# Patient Record
Sex: FEMALE | Race: BLACK OR AFRICAN AMERICAN | NOT HISPANIC OR LATINO | Employment: FULL TIME | URBAN - METROPOLITAN AREA
[De-identification: names, ages, dates, MRNs, and addresses within clinical notes are randomized per-mention and may not be internally consistent; named-entity substitution may affect disease eponyms.]

---

## 2022-07-12 ENCOUNTER — HOSPITAL ENCOUNTER (EMERGENCY)
Facility: HOSPITAL | Age: 37
Discharge: HOME | End: 2022-07-12
Attending: EMERGENCY MEDICINE
Payer: MEDICAID

## 2022-07-12 VITALS
WEIGHT: 132.63 LBS | OXYGEN SATURATION: 100 % | TEMPERATURE: 99 F | RESPIRATION RATE: 18 BRPM | SYSTOLIC BLOOD PRESSURE: 128 MMHG | HEART RATE: 113 BPM | DIASTOLIC BLOOD PRESSURE: 75 MMHG

## 2022-07-12 DIAGNOSIS — R07.81 RIB PAIN: ICD-10-CM

## 2022-07-12 DIAGNOSIS — T14.8XXA MUSCLE STRAIN: Primary | ICD-10-CM

## 2022-07-12 LAB
BILIRUB UR QL STRIP: NEGATIVE
CLARITY UR: CLEAR
COLOR UR: YELLOW
GLUCOSE UR QL STRIP: NEGATIVE
HGB UR QL STRIP: NEGATIVE
KETONES UR QL STRIP: NEGATIVE
LEUKOCYTE ESTERASE UR QL STRIP: ABNORMAL
MICROSCOPIC COMMENT: NORMAL
NITRITE UR QL STRIP: NEGATIVE
PH UR STRIP: 6 [PH] (ref 5–8)
PROT UR QL STRIP: NEGATIVE
SP GR UR STRIP: 1.01 (ref 1–1.03)
URN SPEC COLLECT METH UR: ABNORMAL
UROBILINOGEN UR STRIP-ACNC: NEGATIVE EU/DL
WBC #/AREA URNS HPF: 3 /HPF (ref 0–5)

## 2022-07-12 PROCEDURE — 81000 URINALYSIS NONAUTO W/SCOPE: CPT | Performed by: NURSE PRACTITIONER

## 2022-07-12 PROCEDURE — 99284 EMERGENCY DEPT VISIT MOD MDM: CPT | Mod: 25

## 2022-07-12 RX ORDER — NAPROXEN 375 MG/1
375 TABLET ORAL 2 TIMES DAILY PRN
Qty: 20 TABLET | Refills: 0 | OUTPATIENT
Start: 2022-07-12 | End: 2022-08-01

## 2022-07-12 RX ORDER — METHOCARBAMOL 500 MG/1
1000 TABLET, FILM COATED ORAL 3 TIMES DAILY PRN
Qty: 30 TABLET | Refills: 0 | Status: SHIPPED | OUTPATIENT
Start: 2022-07-12 | End: 2022-07-17

## 2022-07-12 NOTE — Clinical Note
"Benny"Dejan Lorenzo was seen and treated in our emergency department on 7/12/2022.  She may return to work on 07/13/2022.       If you have any questions or concerns, please don't hesitate to call.      Alphonso Grider NP"
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
## 2022-08-01 ENCOUNTER — HOSPITAL ENCOUNTER (EMERGENCY)
Facility: HOSPITAL | Age: 37
Discharge: HOME | End: 2022-08-01
Attending: EMERGENCY MEDICINE
Payer: MEDICAID

## 2022-08-01 VITALS
WEIGHT: 129.63 LBS | SYSTOLIC BLOOD PRESSURE: 138 MMHG | HEART RATE: 99 BPM | TEMPERATURE: 100 F | HEIGHT: 65 IN | BODY MASS INDEX: 21.6 KG/M2 | RESPIRATION RATE: 16 BRPM | OXYGEN SATURATION: 100 % | DIASTOLIC BLOOD PRESSURE: 76 MMHG

## 2022-08-01 DIAGNOSIS — S43.109A: Primary | ICD-10-CM

## 2022-08-01 DIAGNOSIS — M25.512 LEFT SHOULDER PAIN: ICD-10-CM

## 2022-08-01 PROCEDURE — 99283 EMERGENCY DEPT VISIT LOW MDM: CPT

## 2022-08-01 PROCEDURE — 25000003 PHARM REV CODE 250: Performed by: EMERGENCY MEDICINE

## 2022-08-01 RX ORDER — HYDROCODONE BITARTRATE AND ACETAMINOPHEN 5; 325 MG/1; MG/1
1 TABLET ORAL
Status: COMPLETED | OUTPATIENT
Start: 2022-08-01 | End: 2022-08-01

## 2022-08-01 RX ORDER — ONDANSETRON 4 MG/1
4 TABLET, ORALLY DISINTEGRATING ORAL
Status: COMPLETED | OUTPATIENT
Start: 2022-08-01 | End: 2022-08-01

## 2022-08-01 RX ORDER — NAPROXEN 500 MG/1
500 TABLET ORAL 2 TIMES DAILY WITH MEALS
Qty: 60 TABLET | Refills: 0 | Status: SHIPPED | OUTPATIENT
Start: 2022-08-01 | End: 2023-08-19

## 2022-08-01 RX ORDER — ONDANSETRON 4 MG/1
4 TABLET, FILM COATED ORAL EVERY 6 HOURS
Qty: 30 TABLET | Refills: 0 | Status: SHIPPED | OUTPATIENT
Start: 2022-08-01 | End: 2023-08-19

## 2022-08-01 RX ORDER — IBUPROFEN 800 MG/1
800 TABLET ORAL
Status: COMPLETED | OUTPATIENT
Start: 2022-08-01 | End: 2022-08-01

## 2022-08-01 RX ORDER — HYDROCODONE BITARTRATE AND ACETAMINOPHEN 5; 325 MG/1; MG/1
1 TABLET ORAL EVERY 6 HOURS PRN
Qty: 20 TABLET | Refills: 0 | Status: SHIPPED | OUTPATIENT
Start: 2022-08-01 | End: 2023-08-19

## 2022-08-01 RX ADMIN — IBUPROFEN 800 MG: 800 TABLET, FILM COATED ORAL at 02:08

## 2022-08-01 RX ADMIN — ONDANSETRON 4 MG: 4 TABLET, ORALLY DISINTEGRATING ORAL at 02:08

## 2022-08-01 RX ADMIN — HYDROCODONE BITARTRATE AND ACETAMINOPHEN 1 TABLET: 5; 325 TABLET ORAL at 02:08
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
## 2022-08-01 NOTE — FIRST PROVIDER EVALUATION
Medical screening exam completed.  I have conducted a focused provider triage encounter, findings are as follows:    Brief history of present illness:  Patient presents with left shoulder pain and deformity after a fall a couple hours prior to arrival.    There were no vitals filed for this visit.    Pertinent physical exam:      Brief workup plan:      Preliminary workup initiated; this workup will be continued and followed by the physician or advanced practice provider that is assigned to the patient when roomed.

## 2022-08-01 NOTE — Clinical Note
"Benny Vernonimtiaz Lorenzo was seen and treated in our emergency department on 8/1/2022.  She may return to work on 08/02/2022.       If you have any questions or concerns, please don't hesitate to call.       RN    "
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
## 2023-02-05 ENCOUNTER — HOSPITAL ENCOUNTER (EMERGENCY)
Facility: HOSPITAL | Age: 38
Discharge: HOME | End: 2023-02-05
Attending: EMERGENCY MEDICINE
Payer: MEDICAID

## 2023-02-05 VITALS
WEIGHT: 130.81 LBS | SYSTOLIC BLOOD PRESSURE: 120 MMHG | RESPIRATION RATE: 18 BRPM | DIASTOLIC BLOOD PRESSURE: 69 MMHG | TEMPERATURE: 98 F | HEART RATE: 126 BPM | BODY MASS INDEX: 21.77 KG/M2 | OXYGEN SATURATION: 99 %

## 2023-02-05 DIAGNOSIS — M25.512 CHRONIC LEFT SHOULDER PAIN: Primary | ICD-10-CM

## 2023-02-05 DIAGNOSIS — G89.29 CHRONIC LEFT SHOULDER PAIN: Primary | ICD-10-CM

## 2023-02-05 PROCEDURE — 99283 EMERGENCY DEPT VISIT LOW MDM: CPT

## 2023-02-05 RX ORDER — TRAMADOL HYDROCHLORIDE 50 MG/1
50 TABLET ORAL EVERY 6 HOURS PRN
Qty: 15 TABLET | Refills: 0 | Status: SHIPPED | OUTPATIENT
Start: 2023-02-05 | End: 2023-08-19

## 2023-02-05 RX ORDER — TRAMADOL HYDROCHLORIDE 50 MG/1
50 TABLET ORAL EVERY 6 HOURS PRN
Qty: 15 TABLET | Refills: 0 | Status: SHIPPED | OUTPATIENT
Start: 2023-02-05 | End: 2023-02-05 | Stop reason: SDUPTHER

## 2023-02-05 NOTE — ED NOTES
Patient examined, evaluated, and educated on discharge instructions and prescriptions by CRISS Wells, without nursing assistance. Patient discharge to lobby by provider.
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
## 2023-08-19 ENCOUNTER — HOSPITAL ENCOUNTER (INPATIENT)
Facility: HOSPITAL | Age: 38
LOS: 3 days | Discharge: HOME | DRG: 872 | End: 2023-08-23
Attending: EMERGENCY MEDICINE | Admitting: FAMILY MEDICINE

## 2023-08-19 DIAGNOSIS — E87.6 HYPOKALEMIA: Primary | ICD-10-CM

## 2023-08-19 DIAGNOSIS — B96.20 E COLI BACTEREMIA: ICD-10-CM

## 2023-08-19 DIAGNOSIS — R78.81 E COLI BACTEREMIA: ICD-10-CM

## 2023-08-19 DIAGNOSIS — R06.02 SOB (SHORTNESS OF BREATH): ICD-10-CM

## 2023-08-19 DIAGNOSIS — D64.9 SYMPTOMATIC ANEMIA: ICD-10-CM

## 2023-08-19 DIAGNOSIS — N39.0 URINARY TRACT INFECTION WITHOUT HEMATURIA, SITE UNSPECIFIED: ICD-10-CM

## 2023-08-19 DIAGNOSIS — E83.42 HYPOMAGNESEMIA: ICD-10-CM

## 2023-08-19 DIAGNOSIS — J06.9 UPPER RESPIRATORY TRACT INFECTION, UNSPECIFIED TYPE: ICD-10-CM

## 2023-08-19 PROBLEM — K56.1 INTUSSUSCEPTION: Status: ACTIVE | Noted: 2023-08-19

## 2023-08-19 PROBLEM — D69.6 THROMBOCYTOPENIA: Status: ACTIVE | Noted: 2023-08-19

## 2023-08-19 PROBLEM — Z20.822 EXPOSURE TO COVID-19 VIRUS: Status: ACTIVE | Noted: 2023-08-19

## 2023-08-19 PROBLEM — R65.10 SIRS (SYSTEMIC INFLAMMATORY RESPONSE SYNDROME): Status: ACTIVE | Noted: 2023-08-19

## 2023-08-19 PROBLEM — N92.0 MENORRHAGIA WITH REGULAR CYCLE: Status: ACTIVE | Noted: 2023-08-19

## 2023-08-19 PROBLEM — E87.1 HYPONATREMIA: Status: ACTIVE | Noted: 2023-08-19

## 2023-08-19 LAB
ABO + RH BLD: NORMAL
ALBUMIN SERPL BCP-MCNC: 3.2 G/DL (ref 3.5–5.2)
ALBUMIN SERPL BCP-MCNC: 3.9 G/DL (ref 3.5–5.2)
ALP SERPL-CCNC: 73 U/L (ref 55–135)
ALP SERPL-CCNC: 88 U/L (ref 55–135)
ALT SERPL W/O P-5'-P-CCNC: 25 U/L (ref 10–44)
ALT SERPL W/O P-5'-P-CCNC: 33 U/L (ref 10–44)
ANION GAP SERPL CALC-SCNC: 11 MMOL/L (ref 8–16)
ANION GAP SERPL CALC-SCNC: 12 MMOL/L (ref 8–16)
ANISOCYTOSIS BLD QL SMEAR: ABNORMAL
ANISOCYTOSIS BLD QL SMEAR: ABNORMAL
AST SERPL-CCNC: 15 U/L (ref 10–40)
AST SERPL-CCNC: 20 U/L (ref 10–40)
B-HCG UR QL: NEGATIVE
B-HCG UR QL: NEGATIVE
BACTERIA #/AREA URNS HPF: ABNORMAL /HPF
BASOPHILS # BLD AUTO: 0.03 K/UL (ref 0–0.2)
BASOPHILS # BLD AUTO: 0.04 K/UL (ref 0–0.2)
BASOPHILS NFR BLD: 0.6 % (ref 0–1.9)
BASOPHILS NFR BLD: 0.6 % (ref 0–1.9)
BILIRUB SERPL-MCNC: 1.2 MG/DL (ref 0.1–1)
BILIRUB SERPL-MCNC: 1.3 MG/DL (ref 0.1–1)
BILIRUB UR QL STRIP: NEGATIVE
BLD GP AB SCN CELLS X3 SERPL QL: NORMAL
BLD PROD TYP BPU: NORMAL
BLOOD UNIT EXPIRATION DATE: NORMAL
BLOOD UNIT TYPE CODE: 7300
BLOOD UNIT TYPE: NORMAL
BUN SERPL-MCNC: 4 MG/DL (ref 6–20)
BUN SERPL-MCNC: 4 MG/DL (ref 6–20)
CALCIUM SERPL-MCNC: 8.4 MG/DL (ref 8.7–10.5)
CALCIUM SERPL-MCNC: 8.9 MG/DL (ref 8.7–10.5)
CHLORIDE SERPL-SCNC: 100 MMOL/L (ref 95–110)
CHLORIDE SERPL-SCNC: 102 MMOL/L (ref 95–110)
CLARITY UR: CLEAR
CO2 SERPL-SCNC: 19 MMOL/L (ref 23–29)
CO2 SERPL-SCNC: 21 MMOL/L (ref 23–29)
CODING SYSTEM: NORMAL
COLOR UR: YELLOW
CREAT SERPL-MCNC: 0.8 MG/DL (ref 0.5–1.4)
CREAT SERPL-MCNC: 0.9 MG/DL (ref 0.5–1.4)
CROSSMATCH INTERPRETATION: NORMAL
DACRYOCYTES BLD QL SMEAR: ABNORMAL
DACRYOCYTES BLD QL SMEAR: ABNORMAL
DIFFERENTIAL METHOD: ABNORMAL
DIFFERENTIAL METHOD: ABNORMAL
DISPENSE STATUS: NORMAL
EOSINOPHIL # BLD AUTO: 0 K/UL (ref 0–0.5)
EOSINOPHIL # BLD AUTO: 0.2 K/UL (ref 0–0.5)
EOSINOPHIL NFR BLD: 0.2 % (ref 0–8)
EOSINOPHIL NFR BLD: 2.4 % (ref 0–8)
ERYTHROCYTE [DISTWIDTH] IN BLOOD BY AUTOMATED COUNT: 29.1 % (ref 11.5–14.5)
ERYTHROCYTE [DISTWIDTH] IN BLOOD BY AUTOMATED COUNT: 29.7 % (ref 11.5–14.5)
EST. GFR  (NO RACE VARIABLE): >60 ML/MIN/1.73 M^2
EST. GFR  (NO RACE VARIABLE): >60 ML/MIN/1.73 M^2
FERRITIN SERPL-MCNC: 44 NG/ML (ref 20–300)
GLUCOSE SERPL-MCNC: 110 MG/DL (ref 70–110)
GLUCOSE SERPL-MCNC: 126 MG/DL (ref 70–110)
GLUCOSE UR QL STRIP: NEGATIVE
HCT VFR BLD AUTO: 20.4 % (ref 37–48.5)
HCT VFR BLD AUTO: 23.5 % (ref 37–48.5)
HGB BLD-MCNC: 5.4 G/DL (ref 12–16)
HGB BLD-MCNC: 6.2 G/DL (ref 12–16)
HGB UR QL STRIP: ABNORMAL
HYALINE CASTS #/AREA URNS LPF: 0 /LPF
HYPOCHROMIA BLD QL SMEAR: ABNORMAL
HYPOCHROMIA BLD QL SMEAR: ABNORMAL
IMM GRANULOCYTES # BLD AUTO: 0.02 K/UL (ref 0–0.04)
IMM GRANULOCYTES # BLD AUTO: 0.03 K/UL (ref 0–0.04)
IMM GRANULOCYTES NFR BLD AUTO: 0.4 % (ref 0–0.5)
IMM GRANULOCYTES NFR BLD AUTO: 0.5 % (ref 0–0.5)
INFLUENZA A, MOLECULAR: NEGATIVE
INFLUENZA B, MOLECULAR: NEGATIVE
KETONES UR QL STRIP: NEGATIVE
LACTATE SERPL-SCNC: 1.2 MMOL/L (ref 0.5–2.2)
LACTATE SERPL-SCNC: 2 MMOL/L (ref 0.5–2.2)
LEUKOCYTE ESTERASE UR QL STRIP: ABNORMAL
LYMPHOCYTES # BLD AUTO: 0.7 K/UL (ref 1–4.8)
LYMPHOCYTES # BLD AUTO: 1 K/UL (ref 1–4.8)
LYMPHOCYTES NFR BLD: 11.8 % (ref 18–48)
LYMPHOCYTES NFR BLD: 21.1 % (ref 18–48)
MAGNESIUM SERPL-MCNC: 1.4 MG/DL (ref 1.6–2.6)
MAGNESIUM SERPL-MCNC: 2.5 MG/DL (ref 1.6–2.6)
MCH RBC QN AUTO: 15.9 PG (ref 27–31)
MCH RBC QN AUTO: 16.1 PG (ref 27–31)
MCHC RBC AUTO-ENTMCNC: 26.4 G/DL (ref 32–36)
MCHC RBC AUTO-ENTMCNC: 26.5 G/DL (ref 32–36)
MCV RBC AUTO: 60 FL (ref 82–98)
MCV RBC AUTO: 61 FL (ref 82–98)
MICROSCOPIC COMMENT: ABNORMAL
MONOCYTES # BLD AUTO: 0.9 K/UL (ref 0.3–1)
MONOCYTES # BLD AUTO: 1.2 K/UL (ref 0.3–1)
MONOCYTES NFR BLD: 18.5 % (ref 4–15)
MONOCYTES NFR BLD: 19.2 % (ref 4–15)
NEUTROPHILS # BLD AUTO: 2.8 K/UL (ref 1.8–7.7)
NEUTROPHILS # BLD AUTO: 4.2 K/UL (ref 1.8–7.7)
NEUTROPHILS NFR BLD: 58.5 % (ref 38–73)
NEUTROPHILS NFR BLD: 66.2 % (ref 38–73)
NITRITE UR QL STRIP: NEGATIVE
NRBC BLD-RTO: 0 /100 WBC
NRBC BLD-RTO: 0 /100 WBC
NUM UNITS TRANS PACKED RBC: NORMAL
OVALOCYTES BLD QL SMEAR: ABNORMAL
OVALOCYTES BLD QL SMEAR: ABNORMAL
PH UR STRIP: 6 [PH] (ref 5–8)
PLATELET # BLD AUTO: 22 K/UL (ref 150–450)
PLATELET # BLD AUTO: 49 K/UL (ref 150–450)
PLATELET BLD QL SMEAR: ABNORMAL
PLATELET BLD QL SMEAR: ABNORMAL
PMV BLD AUTO: ABNORMAL FL (ref 9.2–12.9)
PMV BLD AUTO: ABNORMAL FL (ref 9.2–12.9)
POIKILOCYTOSIS BLD QL SMEAR: ABNORMAL
POIKILOCYTOSIS BLD QL SMEAR: ABNORMAL
POLYCHROMASIA BLD QL SMEAR: ABNORMAL
POLYCHROMASIA BLD QL SMEAR: ABNORMAL
POTASSIUM SERPL-SCNC: 3 MMOL/L (ref 3.5–5.1)
POTASSIUM SERPL-SCNC: 3.8 MMOL/L (ref 3.5–5.1)
PROT SERPL-MCNC: 6.9 G/DL (ref 6–8.4)
PROT SERPL-MCNC: 8.4 G/DL (ref 6–8.4)
PROT UR QL STRIP: ABNORMAL
RBC # BLD AUTO: 3.39 M/UL (ref 4–5.4)
RBC # BLD AUTO: 3.85 M/UL (ref 4–5.4)
RBC #/AREA URNS HPF: 3 /HPF (ref 0–4)
SARS-COV-2 RDRP RESP QL NAA+PROBE: NEGATIVE
SCHISTOCYTES BLD QL SMEAR: PRESENT
SCHISTOCYTES BLD QL SMEAR: PRESENT
SODIUM SERPL-SCNC: 131 MMOL/L (ref 136–145)
SODIUM SERPL-SCNC: 134 MMOL/L (ref 136–145)
SP GR UR STRIP: 1.01 (ref 1–1.03)
SPECIMEN OUTDATE: NORMAL
SPECIMEN SOURCE: NORMAL
URN SPEC COLLECT METH UR: ABNORMAL
UROBILINOGEN UR STRIP-ACNC: ABNORMAL EU/DL
WBC # BLD AUTO: 4.79 K/UL (ref 3.9–12.7)
WBC # BLD AUTO: 6.27 K/UL (ref 3.9–12.7)
WBC #/AREA URNS HPF: 10 /HPF (ref 0–5)

## 2023-08-19 PROCEDURE — 81025 URINE PREGNANCY TEST: CPT | Mod: 91 | Performed by: PHYSICIAN ASSISTANT

## 2023-08-19 PROCEDURE — 93005 ELECTROCARDIOGRAM TRACING: CPT

## 2023-08-19 PROCEDURE — 86920 COMPATIBILITY TEST SPIN: CPT | Performed by: EMERGENCY MEDICINE

## 2023-08-19 PROCEDURE — G0378 HOSPITAL OBSERVATION PER HR: HCPCS

## 2023-08-19 PROCEDURE — 99223 1ST HOSP IP/OBS HIGH 75: CPT | Mod: ,,, | Performed by: SURGERY

## 2023-08-19 PROCEDURE — 80053 COMPREHEN METABOLIC PANEL: CPT | Performed by: PHYSICIAN ASSISTANT

## 2023-08-19 PROCEDURE — 85025 COMPLETE CBC W/AUTO DIFF WBC: CPT | Performed by: PHYSICIAN ASSISTANT

## 2023-08-19 PROCEDURE — 93010 ELECTROCARDIOGRAM REPORT: CPT | Mod: 76,,, | Performed by: INTERNAL MEDICINE

## 2023-08-19 PROCEDURE — 99223 PR INITIAL HOSPITAL CARE,LEVL III: ICD-10-PCS | Mod: ,,, | Performed by: SURGERY

## 2023-08-19 PROCEDURE — 85025 COMPLETE CBC W/AUTO DIFF WBC: CPT | Mod: 91 | Performed by: FAMILY MEDICINE

## 2023-08-19 PROCEDURE — 87502 INFLUENZA DNA AMP PROBE: CPT | Performed by: PHYSICIAN ASSISTANT

## 2023-08-19 PROCEDURE — 96366 THER/PROPH/DIAG IV INF ADDON: CPT

## 2023-08-19 PROCEDURE — 87040 BLOOD CULTURE FOR BACTERIA: CPT | Mod: 59 | Performed by: PHYSICIAN ASSISTANT

## 2023-08-19 PROCEDURE — 99222 PR INITIAL HOSPITAL CARE,LEVL II: ICD-10-PCS | Mod: ,,, | Performed by: OBSTETRICS & GYNECOLOGY

## 2023-08-19 PROCEDURE — 36430 TRANSFUSION BLD/BLD COMPNT: CPT

## 2023-08-19 PROCEDURE — 25500020 PHARM REV CODE 255: Performed by: FAMILY MEDICINE

## 2023-08-19 PROCEDURE — 96367 TX/PROPH/DG ADDL SEQ IV INF: CPT

## 2023-08-19 PROCEDURE — 93010 EKG 12-LEAD: ICD-10-PCS | Mod: 76,,, | Performed by: INTERNAL MEDICINE

## 2023-08-19 PROCEDURE — 83735 ASSAY OF MAGNESIUM: CPT | Performed by: EMERGENCY MEDICINE

## 2023-08-19 PROCEDURE — 96365 THER/PROPH/DIAG IV INF INIT: CPT

## 2023-08-19 PROCEDURE — 96368 THER/DIAG CONCURRENT INF: CPT

## 2023-08-19 PROCEDURE — U0002 COVID-19 LAB TEST NON-CDC: HCPCS | Performed by: PHYSICIAN ASSISTANT

## 2023-08-19 PROCEDURE — 96361 HYDRATE IV INFUSION ADD-ON: CPT

## 2023-08-19 PROCEDURE — 84466 ASSAY OF TRANSFERRIN: CPT | Performed by: FAMILY MEDICINE

## 2023-08-19 PROCEDURE — 63600175 PHARM REV CODE 636 W HCPCS: Performed by: EMERGENCY MEDICINE

## 2023-08-19 PROCEDURE — 82728 ASSAY OF FERRITIN: CPT | Performed by: FAMILY MEDICINE

## 2023-08-19 PROCEDURE — 86900 BLOOD TYPING SEROLOGIC ABO: CPT | Performed by: EMERGENCY MEDICINE

## 2023-08-19 PROCEDURE — 87186 SC STD MICRODIL/AGAR DIL: CPT | Performed by: PHYSICIAN ASSISTANT

## 2023-08-19 PROCEDURE — 99291 CRITICAL CARE FIRST HOUR: CPT

## 2023-08-19 PROCEDURE — 63600175 PHARM REV CODE 636 W HCPCS: Performed by: PHYSICIAN ASSISTANT

## 2023-08-19 PROCEDURE — 83735 ASSAY OF MAGNESIUM: CPT | Mod: 91 | Performed by: FAMILY MEDICINE

## 2023-08-19 PROCEDURE — 81025 URINE PREGNANCY TEST: CPT | Performed by: EMERGENCY MEDICINE

## 2023-08-19 PROCEDURE — 83605 ASSAY OF LACTIC ACID: CPT | Performed by: PHYSICIAN ASSISTANT

## 2023-08-19 PROCEDURE — 87077 CULTURE AEROBIC IDENTIFY: CPT | Performed by: PHYSICIAN ASSISTANT

## 2023-08-19 PROCEDURE — P9016 RBC LEUKOCYTES REDUCED: HCPCS | Performed by: EMERGENCY MEDICINE

## 2023-08-19 PROCEDURE — 93010 ELECTROCARDIOGRAM REPORT: CPT | Mod: ,,, | Performed by: INTERNAL MEDICINE

## 2023-08-19 PROCEDURE — 83540 ASSAY OF IRON: CPT | Performed by: FAMILY MEDICINE

## 2023-08-19 PROCEDURE — 80053 COMPREHEN METABOLIC PANEL: CPT | Mod: 91 | Performed by: FAMILY MEDICINE

## 2023-08-19 PROCEDURE — 63600175 PHARM REV CODE 636 W HCPCS: Performed by: FAMILY MEDICINE

## 2023-08-19 PROCEDURE — 36415 COLL VENOUS BLD VENIPUNCTURE: CPT | Performed by: EMERGENCY MEDICINE

## 2023-08-19 PROCEDURE — 81000 URINALYSIS NONAUTO W/SCOPE: CPT | Performed by: PHYSICIAN ASSISTANT

## 2023-08-19 PROCEDURE — 87154 CUL TYP ID BLD PTHGN 6+ TRGT: CPT | Performed by: PHYSICIAN ASSISTANT

## 2023-08-19 PROCEDURE — 25000003 PHARM REV CODE 250: Performed by: EMERGENCY MEDICINE

## 2023-08-19 PROCEDURE — 25000003 PHARM REV CODE 250: Performed by: PHYSICIAN ASSISTANT

## 2023-08-19 PROCEDURE — 99222 1ST HOSP IP/OBS MODERATE 55: CPT | Mod: ,,, | Performed by: OBSTETRICS & GYNECOLOGY

## 2023-08-19 PROCEDURE — 96374 THER/PROPH/DIAG INJ IV PUSH: CPT | Mod: 59

## 2023-08-19 RX ORDER — LANOLIN ALCOHOL/MO/W.PET/CERES
1 CREAM (GRAM) TOPICAL DAILY
Status: DISCONTINUED | OUTPATIENT
Start: 2023-08-20 | End: 2023-08-20

## 2023-08-19 RX ORDER — ESCITALOPRAM OXALATE 10 MG/1
10 TABLET ORAL NIGHTLY
Status: DISCONTINUED | OUTPATIENT
Start: 2023-08-19 | End: 2023-08-20

## 2023-08-19 RX ORDER — QUETIAPINE FUMARATE 25 MG/1
50 TABLET, FILM COATED ORAL NIGHTLY
Status: DISCONTINUED | OUTPATIENT
Start: 2023-08-19 | End: 2023-08-20

## 2023-08-19 RX ORDER — ACETAMINOPHEN 500 MG
1000 TABLET ORAL
Status: COMPLETED | OUTPATIENT
Start: 2023-08-19 | End: 2023-08-19

## 2023-08-19 RX ORDER — POTASSIUM CHLORIDE 7.45 MG/ML
10 INJECTION INTRAVENOUS
Status: COMPLETED | OUTPATIENT
Start: 2023-08-19 | End: 2023-08-19

## 2023-08-19 RX ORDER — ONDANSETRON 2 MG/ML
4 INJECTION INTRAMUSCULAR; INTRAVENOUS
Status: COMPLETED | OUTPATIENT
Start: 2023-08-19 | End: 2023-08-19

## 2023-08-19 RX ORDER — FERROUS SULFATE 325(65) MG
325 TABLET ORAL
COMMUNITY

## 2023-08-19 RX ORDER — MAGNESIUM SULFATE HEPTAHYDRATE 40 MG/ML
2 INJECTION, SOLUTION INTRAVENOUS
Status: COMPLETED | OUTPATIENT
Start: 2023-08-19 | End: 2023-08-19

## 2023-08-19 RX ORDER — SODIUM CHLORIDE 0.9 % (FLUSH) 0.9 %
10 SYRINGE (ML) INJECTION
Status: DISCONTINUED | OUTPATIENT
Start: 2023-08-19 | End: 2023-08-23 | Stop reason: HOSPADM

## 2023-08-19 RX ORDER — IBUPROFEN 600 MG/1
600 TABLET ORAL
Status: COMPLETED | OUTPATIENT
Start: 2023-08-19 | End: 2023-08-19

## 2023-08-19 RX ORDER — ESCITALOPRAM OXALATE 10 MG/1
10 TABLET ORAL DAILY
COMMUNITY
Start: 2023-04-19

## 2023-08-19 RX ORDER — QUETIAPINE FUMARATE 50 MG/1
50 TABLET, FILM COATED ORAL NIGHTLY
COMMUNITY
Start: 2023-04-19

## 2023-08-19 RX ORDER — POTASSIUM CHLORIDE 7.45 MG/ML
20 INJECTION INTRAVENOUS
Status: DISCONTINUED | OUTPATIENT
Start: 2023-08-19 | End: 2023-08-19

## 2023-08-19 RX ORDER — HYDROCODONE BITARTRATE AND ACETAMINOPHEN 500; 5 MG/1; MG/1
TABLET ORAL
Status: DISCONTINUED | OUTPATIENT
Start: 2023-08-19 | End: 2023-08-23 | Stop reason: HOSPADM

## 2023-08-19 RX ADMIN — SODIUM CHLORIDE, POTASSIUM CHLORIDE, SODIUM LACTATE AND CALCIUM CHLORIDE 1680 ML: 600; 310; 30; 20 INJECTION, SOLUTION INTRAVENOUS at 12:08

## 2023-08-19 RX ADMIN — POTASSIUM CHLORIDE 10 MEQ: 7.46 INJECTION, SOLUTION INTRAVENOUS at 01:08

## 2023-08-19 RX ADMIN — POTASSIUM CHLORIDE 10 MEQ: 7.46 INJECTION, SOLUTION INTRAVENOUS at 03:08

## 2023-08-19 RX ADMIN — ONDANSETRON 4 MG: 2 INJECTION INTRAMUSCULAR; INTRAVENOUS at 06:08

## 2023-08-19 RX ADMIN — POTASSIUM BICARBONATE 40 MEQ: 391 TABLET, EFFERVESCENT ORAL at 01:08

## 2023-08-19 RX ADMIN — IBUPROFEN 600 MG: 600 TABLET, FILM COATED ORAL at 01:08

## 2023-08-19 RX ADMIN — IOHEXOL 100 ML: 350 INJECTION, SOLUTION INTRAVENOUS at 06:08

## 2023-08-19 RX ADMIN — ACETAMINOPHEN 1000 MG: 500 TABLET ORAL at 11:08

## 2023-08-19 RX ADMIN — CEFTRIAXONE 1 G: 1 INJECTION, POWDER, FOR SOLUTION INTRAMUSCULAR; INTRAVENOUS at 01:08

## 2023-08-19 RX ADMIN — MAGNESIUM SULFATE HEPTAHYDRATE 2 G: 40 INJECTION, SOLUTION INTRAVENOUS at 03:08

## 2023-08-19 NOTE — ASSESSMENT & PLAN NOTE
This can certainly contribute to heavy menstrual bleeding.  Not sure if this has been a chronic problem vs a new acute issue for the patient.    Splenomegaly noted on CT.  Recommend heme/onc consultation

## 2023-08-19 NOTE — ASSESSMENT & PLAN NOTE
Declines pelvic exam today in the ER.    Will await pelvic ultrasound findings and plan further work-up as an outpatient

## 2023-08-19 NOTE — SUBJECTIVE & OBJECTIVE
OB History    Para Term  AB Living   3 3 3 0 0 0   SAB IAB Ectopic Multiple Live Births   0 0 0 0 0      # Outcome Date GA Lbr Cheikh/2nd Weight Sex Delivery Anes PTL Lv   3 Term            2 Term            1 Term              Past Medical History:   Diagnosis Date    Anemia, unspecified      Past Surgical History:   Procedure Laterality Date     SECTION      x3    TONSILLECTOMY      TUBAL LIGATION Bilateral        (Not in a hospital admission)      Review of patient's allergies indicates:  No Known Allergies     Family History    None       Tobacco Use    Smoking status: Never    Smokeless tobacco: Not on file   Substance and Sexual Activity    Alcohol use: Never    Drug use: Not on file    Sexual activity: Yes     Partners: Male     Birth control/protection: See Surgical Hx     Review of Systems   Constitutional:  Positive for fatigue and fever. Negative for unexpected weight change.   Respiratory:  Positive for shortness of breath.    Gastrointestinal:  Negative for abdominal pain, constipation, diarrhea, nausea and vomiting.   Genitourinary:  Positive for menorrhagia and menstrual problem. Negative for dysuria, frequency, pelvic pain, urgency, vaginal bleeding, vaginal discharge, vaginal pain, postcoital bleeding and vaginal odor.      Objective:     Vital Signs (Most Recent):  Temp: 98.3 °F (36.8 °C) (23)  Pulse: 85 (23)  Resp: (!) 23 (23)  BP: 104/66 (23)  SpO2: 100 % (23) Vital Signs (24h Range):  Temp:  [98.2 °F (36.8 °C)-102.9 °F (39.4 °C)] 98.3 °F (36.8 °C)  Pulse:  [] 85  Resp:  [16-30] 23  SpO2:  [98 %-100 %] 100 %  BP: (104-128)/(66-80) 104/66     Weight: 56 kg (123 lb 7.3 oz)  Body mass index is 20.54 kg/m².    No LMP recorded.     Physical Exam:   Constitutional: She is oriented to person, place, and time. She appears well-developed and well-nourished. No distress.    HENT:   Head: Normocephalic and atraumatic.      Neck: No thyromegaly present.     Pulmonary/Chest: Effort normal.        Abdominal: Soft. She exhibits no distension and no mass. There is no abdominal tenderness. There is no rebound and no guarding.             Musculoskeletal: No edema.       Neurological: She is alert and oriented to person, place, and time.    Skin: No rash noted.   Perioral pallor noted    Psychiatric: She has a normal mood and affect. Her behavior is normal. Judgment and thought content normal.     Patient declines pelvic exam     Laboratory:  Recent Lab Results  (Last 5 results in the past 24 hours)        08/19/23  1610   08/19/23  1354   08/19/23  1318   08/19/23  1201   08/19/23  1127        Unit Blood Type Code   7300  [P]             Unit Expiration   [id number]  [P]             Unit Blood Type   B POS  [P]             Albumin 3.2       3.9         Alkaline Phosphatase 73       88         ALT 25       33         Anion Gap 11       12         Aniso Moderate       Moderate         Appearance, UA               AST 15       20         Bacteria, UA               Baso # 0.03       0.04         Basophil % 0.6       0.6         Bilirubin (UA)               BILIRUBIN TOTAL 1.2  Comment: For infants and newborns, interpretation of results should be based  on gestational age, weight and in agreement with clinical  observations.    Premature Infant recommended reference ranges:  Up to 24 hours.............<8.0 mg/dL  Up to 48 hours............<12.0 mg/dL  3-5 days..................<15.0 mg/dL  6-29 days.................<15.0 mg/dL         1.3  Comment: For infants and newborns, interpretation of results should be based  on gestational age, weight and in agreement with clinical  observations.    Premature Infant recommended reference ranges:  Up to 24 hours.............<8.0 mg/dL  Up to 48 hours............<12.0 mg/dL  3-5 days..................<15.0 mg/dL  6-29 days.................<15.0 mg/dL           BUN 4       4         Calcium 8.4        8.9         Chloride 102       100         CO2 21       19         CODING SYSTEM   [id number]  [P]             Color, UA               Creatinine 0.8       0.9         Crossmatch Interpretation   Compatible  [P]             Differential Method Automated       Automated         DISPENSE STATUS   ISSUED  [P]             eGFR >60       >60         Eos # 0.0       0.2         Eosinophil % 0.2       2.4         Ferritin 44               Glucose 110       126         Glucose, UA               Gran # (ANC) 2.8       4.2         Gran % 58.5       66.2         Group & Rh   B POS             Hematocrit 20.4       23.5         Hemoglobin 5.4  Comment: HGB AND PLT critical result(s) called and verbal readback obtained   from BERNARDA HIGHTOWER RN by GERARDO 08/19/2023 16:40         6.2         Hyaline Casts, UA               Hypo Occasional       Occasional         Immature Grans (Abs) 0.02  Comment: Mild elevation in immature granulocytes is non specific and   can be seen in a variety of conditions including stress response,   acute inflammation, trauma and pregnancy. Correlation with other   laboratory and clinical findings is essential.         0.03  Comment: Mild elevation in immature granulocytes is non specific and   can be seen in a variety of conditions including stress response,   acute inflammation, trauma and pregnancy. Correlation with other   laboratory and clinical findings is essential.           Immature Granulocytes 0.4       0.5         INDIRECT ANDRES   NEG             Ketones, UA               Lactate, Narinder   1.2  Comment: Falsely low lactic acid results can be found in samples   containing >=13.0 mg/dL total bilirubin and/or >=3.5 mg/dL   direct bilirubin.       2.0  Comment: Falsely low lactic acid results can be found in samples   containing >=13.0 mg/dL total bilirubin and/or >=3.5 mg/dL   direct bilirubin.           Leukocytes, UA               Lymph # 1.0       0.7         Lymph % 21.1       11.8         Magnesium  2.5   1.4             MCH 15.9       16.1         MCHC 26.5       26.4         MCV 60       61         Microscopic Comment               Mono # 0.9       1.2         Mono % 19.2       18.5         MPV SEE COMMENT  Comment: Result not available.       SEE COMMENT  Comment: Result not available.         NITRITE UA               nRBC 0       0         Occult Blood UA               Ovalocytes Occasional       Occasional         pH, UA               Platelet Estimate Decreased       Decreased         Platelets 22  Comment: HGB AND PLT critical result(s) called and verbal readback obtained   from BERNARDA HIGHTOWER RN by GERARDO 08/19/2023 16:40         49         Poikilocytosis Moderate       Moderate         Poly Occasional       Occasional         Potassium 3.8       3.0         Preg Test, Ur     Negative     Negative       Product Code   [id number]  [P]             PROTEIN TOTAL 6.9       8.4         Protein, UA               RBC 3.39       3.85         RBC, UA               RDW 29.1       29.7         Schistocytes Present       Present         Sodium 134       131         Specific [place], UA               Specimen Outdate   08/22/2023 23:59             Specimen UA               Teardrop Cells Occasional       Occasional         UNIT NUMBER   [id number]  [P]             UROBILINOGEN UA               WBC, UA               WBC 4.79       6.27                                 [P] - Preliminary Result               Diagnostic Results:  CT: Reviewed  Has not had ultrasound yet

## 2023-08-19 NOTE — ASSESSMENT & PLAN NOTE
Associated with abdominal pain  Surgery consulted  Repeat ct abdomen pelvis with po contrast  npo

## 2023-08-19 NOTE — HPI
38-year-old female referred for intussusception.  Patient presented with fatigue, weakness, shortness of breath and fever, recent COVID exposure with associated sore throat, abdominal.  She denies any nausea or emesis.  Having regular nonbloody bowel movements.  She underwent CT scan for evaluation noting possible jejunojejunal intussusception.

## 2023-08-19 NOTE — SUBJECTIVE & OBJECTIVE
Past Medical History:   Diagnosis Date    Anemia, unspecified        Past Surgical History:   Procedure Laterality Date    TONSILLECTOMY      TUBAL LIGATION Bilateral        Review of patient's allergies indicates:  No Known Allergies    No current facility-administered medications on file prior to encounter.     Current Outpatient Medications on File Prior to Encounter   Medication Sig    ferrous sulfate (FEOSOL) 325 mg (65 mg iron) Tab tablet Take 325 mg by mouth with breakfast.    LEXAPRO 10 mg tablet Take 10 mg by mouth.    SEROQUEL 50 mg tablet Take 50 mg by mouth every evening.    [DISCONTINUED] HYDROcodone-acetaminophen (NORCO) 5-325 mg per tablet Take 1 tablet by mouth every 6 (six) hours as needed for Pain.    [DISCONTINUED] naproxen (NAPROSYN) 500 MG tablet Take 1 tablet (500 mg total) by mouth 2 (two) times daily with meals.    [DISCONTINUED] ondansetron (ZOFRAN) 4 MG tablet Take 1 tablet (4 mg total) by mouth every 6 (six) hours.    [DISCONTINUED] traMADoL (ULTRAM) 50 mg tablet Take 1 tablet (50 mg total) by mouth every 6 (six) hours as needed for Pain.     Family History    None       Tobacco Use    Smoking status: Not on file    Smokeless tobacco: Not on file   Substance and Sexual Activity    Alcohol use: Not on file    Drug use: Not on file    Sexual activity: Not on file     Review of Systems   Constitutional:  Positive for activity change, appetite change, diaphoresis, fatigue, fever and unexpected weight change.   Respiratory:  Positive for shortness of breath.    Cardiovascular:  Negative for chest pain and leg swelling.   Gastrointestinal:  Positive for abdominal pain and nausea. Negative for vomiting.   Genitourinary:  Positive for menstrual problem.   Musculoskeletal:  Positive for back pain.   Neurological:  Positive for weakness.   Psychiatric/Behavioral:  Negative for agitation, behavioral problems, confusion, decreased concentration and dysphoric mood. The patient is not nervous/anxious.       Objective:     Vital Signs (Most Recent):  Temp: 98.7 °F (37.1 °C) (08/19/23 1449)  Pulse: 93 (08/19/23 1449)  Resp: (!) 26 (08/19/23 1449)  BP: 114/73 (08/19/23 1449)  SpO2: 99 % (08/19/23 1449) Vital Signs (24h Range):  Temp:  [98.7 °F (37.1 °C)-102.9 °F (39.4 °C)] 98.7 °F (37.1 °C)  Pulse:  [] 93  Resp:  [16-30] 26  SpO2:  [99 %-100 %] 99 %  BP: (114-128)/(73-80) 114/73     Weight: 56 kg (123 lb 7.3 oz)  Body mass index is 20.54 kg/m².     Physical Exam  Vitals and nursing note reviewed. Exam conducted with a chaperone present (nursing, visitor).   Constitutional:       General: She is not in acute distress.     Appearance: She is underweight. She is ill-appearing and diaphoretic. She is not toxic-appearing.   HENT:      Head: Normocephalic and atraumatic.   Cardiovascular:      Rate and Rhythm: Normal rate.   Pulmonary:      Effort: Pulmonary effort is normal. No respiratory distress.   Abdominal:      Palpations: Abdomen is soft.      Tenderness: There is abdominal tenderness.   Musculoskeletal:      Right lower leg: No edema.      Left lower leg: No edema.   Skin:     General: Skin is warm.      Capillary Refill: Capillary refill takes 2 to 3 seconds.   Neurological:      Mental Status: She is alert and oriented to person, place, and time.   Psychiatric:         Mood and Affect: Mood normal. Affect is flat.         Behavior: Behavior normal. Behavior is cooperative.                Significant Labs: All pertinent labs within the past 24 hours have been reviewed.  Blood Culture: pending    CBC:   Recent Labs   Lab 08/19/23  1201   WBC 6.27   HGB 6.2*   HCT 23.5*   PLT 49*     CMP:   Recent Labs   Lab 08/19/23  1201   *   K 3.0*      CO2 19*   *   BUN 4*   CREATININE 0.9   CALCIUM 8.9   PROT 8.4   ALBUMIN 3.9   BILITOT 1.3*   ALKPHOS 88   AST 20   ALT 33   ANIONGAP 12     Lactic Acid:   Recent Labs   Lab 08/19/23  1201 08/19/23  1354   LACTATE 2.0 1.2     Magnesium:   Recent Labs    Lab 08/19/23  1354   MG 1.4*     Urine Studies:   Recent Labs   Lab 08/19/23  1126   COLORU Yellow   APPEARANCEUA Clear   PHUR 6.0   SPECGRAV 1.015   PROTEINUA 2+*   GLUCUA Negative   KETONESU Negative   BILIRUBINUA Negative   OCCULTUA 1+*   NITRITE Negative   UROBILINOGEN 4.0-6.0*   LEUKOCYTESUR Trace*   RBCUA 3   WBCUA 10*   BACTERIA Rare   HYALINECASTS 0       Significant Imaging: I have reviewed all pertinent imaging results/findings within the past 24 hours.  CT: I have reviewed all pertinent results/findings within the past 24 hours and my personal findings are:  abdomen pelvis wo contrast intussusception, splenomegaly  CXR: I have reviewed all pertinent results/findings within the past 24 hours and my personal findings are:  no acute findings  U/S: I have reviewed all pertinent results/findings within the past 24 hours and my personal findings are:  pending

## 2023-08-19 NOTE — ASSESSMENT & PLAN NOTE
Based on tachycardia, tachypnea, fever  Sofa 1  Received rocephin in emergency department  Source control  Continue intravenous rocephin

## 2023-08-19 NOTE — ASSESSMENT & PLAN NOTE
Spoke to [employer] (HIPAA). Two pt identifiers confirmed. Kerrie Paredes informed that the amlodipine, lipitor, coreg have been pended to [address] [place] [address]. Kerrie Paredes asking for Dm supplies sent as well--pended. Kenia Strange to have pt's labs collected--informed Kerrie Paredes where to have labs done. Kerrie Paredes verbalized understanding of information discussed w/ no further questions at this time. Transfuse blood  Repeat/trend hb/hct   Source of bleed? Menorrhagia vs GI bleed

## 2023-08-19 NOTE — HPI
39 y/o presented to the ED with concern for possible COVID (chills, shortness of breath, and fever).  During work-up, pt noted to be anemic with Hb of 6 and thrombocytopenic with platelets 49K.  Pt negative for COVID and flu.  Upon further questioning, pt reports heavy periods.  States for the last year, she has 2 periods per month associated with heavy flow and cramping.  Prior to 1 year ago, periods were monthly, duration 7 days with heavy flow.  No current bleeding today.  Pt is being transfused 1 U PRBC and being admitted to hospital medicine service.  GYN consulted for hx of menorrhagia.  Pt had a normal pap 7 months ago at a [facility] health clinic.  She is MM with her .  Had BTL with her 3rd  delivery.  No hx of GC/CT or PID.    Denies any current pelvic pain or current vaginal bleeding.

## 2023-08-19 NOTE — CONSULTS
[facility] - Emergency Dept.  Obstetrics & Gynecology  Consult Note    Patient Name: Benny Lorenzo  MRN: [id number]  Admission Date: 2023  Hospital Length of Stay: 0 days  Code Status: Full Code  Primary Care Provider: No, Primary Doctor  Principal Problem: Anemia    Consults  Subjective:     Chief Complaint: fatigue, fever, and shortness of breath    History of Present Illness:  39 y/o presented to the ED with concern for possible COVID (chills, shortness of breath, and fever).  During work-up, pt noted to be anemic with Hb of 6 and thrombocytopenic with platelets 49K.  Pt negative for COVID and flu.  Upon further questioning, pt reports heavy periods.  States for the last year, she has 2 periods per month associated with heavy flow and cramping.  Prior to 1 year ago, periods were monthly, duration 7 days with heavy flow.  No current bleeding today.  Pt is being transfused 1 U PRBC and being admitted to hospital medicine service.  GYN consulted for hx of menorrhagia.  Pt had a normal pap 7 months ago at a community health clinic.  She is MM with her .  Had BTL with her 3rd  delivery.  No hx of GC/CT or PID.    Denies any current pelvic pain or current vaginal bleeding.          OB History    Para Term  AB Living   3 3 3 0 0 0   SAB IAB Ectopic Multiple Live Births   0 0 0 0 0      # Outcome Date GA Lbr Cheikh/2nd Weight Sex Delivery Anes PTL Lv   3 Term            2 Term            1 Term              Past Medical History:   Diagnosis Date    Anemia, unspecified      Past Surgical History:   Procedure Laterality Date     SECTION      x3    TONSILLECTOMY      TUBAL LIGATION Bilateral        (Not in a hospital admission)      Review of patient's allergies indicates:  No Known Allergies     Family History    None       Tobacco Use    Smoking status: Never    Smokeless tobacco: Not on file   Substance and Sexual Activity    Alcohol use: Never    Drug use: Not on file     Sexual activity: Yes     Partners: Male     Birth control/protection: See Surgical Hx     Review of Systems   Constitutional:  Positive for fatigue and fever. Negative for unexpected weight change.   Respiratory:  Positive for shortness of breath.    Gastrointestinal:  Negative for abdominal pain, constipation, diarrhea, nausea and vomiting.   Genitourinary:  Positive for menorrhagia and menstrual problem. Negative for dysuria, frequency, pelvic pain, urgency, vaginal bleeding, vaginal discharge, vaginal pain, postcoital bleeding and vaginal odor.      Objective:     Vital Signs (Most Recent):  Temp: 98.3 °F (36.8 °C) (08/19/23 1713)  Pulse: 85 (08/19/23 1713)  Resp: (!) 23 (08/19/23 1713)  BP: 104/66 (08/19/23 1713)  SpO2: 100 % (08/19/23 1713) Vital Signs (24h Range):  Temp:  [98.2 °F (36.8 °C)-102.9 °F (39.4 °C)] 98.3 °F (36.8 °C)  Pulse:  [] 85  Resp:  [16-30] 23  SpO2:  [98 %-100 %] 100 %  BP: (104-128)/(66-80) 104/66     Weight: 56 kg (123 lb 7.3 oz)  Body mass index is 20.54 kg/m².    No LMP recorded.     Physical Exam:   Constitutional: She is oriented to person, place, and time. She appears well-developed and well-nourished. No distress.    HENT:   Head: Normocephalic and atraumatic.     Neck: No thyromegaly present.     Pulmonary/Chest: Effort normal.        Abdominal: Soft. She exhibits no distension and no mass. There is no abdominal tenderness. There is no rebound and no guarding.             Musculoskeletal: No edema.       Neurological: She is alert and oriented to person, place, and time.    Skin: No rash noted.   Perioral pallor noted    Psychiatric: She has a normal mood and affect. Her behavior is normal. Judgment and thought content normal.     Patient declines pelvic exam     Laboratory:  Recent Lab Results  (Last 5 results in the past 24 hours)        08/19/23  1610   08/19/23  1354   08/19/23  1318   08/19/23  1201   08/19/23  1127        Unit Blood Type Code   7300  [P]             Unit  Expiration   [id number]  [P]             Unit Blood Type   B POS  [P]             Albumin 3.2       3.9         Alkaline Phosphatase 73       88         ALT 25       33         Anion Gap 11       12         Aniso Moderate       Moderate         Appearance, UA               AST 15       20         Bacteria, UA               Baso # 0.03       0.04         Basophil % 0.6       0.6         Bilirubin (UA)               BILIRUBIN TOTAL 1.2  Comment: For infants and newborns, interpretation of results should be based  on gestational age, weight and in agreement with clinical  observations.    Premature Infant recommended reference ranges:  Up to 24 hours.............<8.0 mg/dL  Up to 48 hours............<12.0 mg/dL  3-5 days..................<15.0 mg/dL  6-29 days.................<15.0 mg/dL         1.3  Comment: For infants and newborns, interpretation of results should be based  on gestational age, weight and in agreement with clinical  observations.    Premature Infant recommended reference ranges:  Up to 24 hours.............<8.0 mg/dL  Up to 48 hours............<12.0 mg/dL  3-5 days..................<15.0 mg/dL  6-29 days.................<15.0 mg/dL           BUN 4       4         Calcium 8.4       8.9         Chloride 102       100         CO2 21       19         CODING SYSTEM   [id number]  [P]             Color, UA               Creatinine 0.8       0.9         Crossmatch Interpretation   Compatible  [P]             Differential Method Automated       Automated         DISPENSE STATUS   ISSUED  [P]             eGFR >60       >60         Eos # 0.0       0.2         Eosinophil % 0.2       2.4         Ferritin 44               Glucose 110       126         Glucose, UA               Gran # (ANC) 2.8       4.2         Gran % 58.5       66.2         Group & Rh   B POS             Hematocrit 20.4       23.5         Hemoglobin 5.4  Comment: HGB AND PLT critical result(s) called and verbal readback obtained   from BERNARDA  MICHAEL HIGHTOWER by GERARDO 08/19/2023 16:40         6.2         Hyaline Casts, UA               Hypo Occasional       Occasional         Immature Grans (Abs) 0.02  Comment: Mild elevation in immature granulocytes is non specific and   can be seen in a variety of conditions including stress response,   acute inflammation, trauma and pregnancy. Correlation with other   laboratory and clinical findings is essential.         0.03  Comment: Mild elevation in immature granulocytes is non specific and   can be seen in a variety of conditions including stress response,   acute inflammation, trauma and pregnancy. Correlation with other   laboratory and clinical findings is essential.           Immature Granulocytes 0.4       0.5         INDIRECT ANDRES   NEG             Ketones, UA               Lactate, Narinder   1.2  Comment: Falsely low lactic acid results can be found in samples   containing >=13.0 mg/dL total bilirubin and/or >=3.5 mg/dL   direct bilirubin.       2.0  Comment: Falsely low lactic acid results can be found in samples   containing >=13.0 mg/dL total bilirubin and/or >=3.5 mg/dL   direct bilirubin.           Leukocytes, UA               Lymph # 1.0       0.7         Lymph % 21.1       11.8         Magnesium 2.5   1.4             MCH 15.9       16.1         MCHC 26.5       26.4         MCV 60       61         Microscopic Comment               Mono # 0.9       1.2         Mono % 19.2       18.5         MPV SEE COMMENT  Comment: Result not available.       SEE COMMENT  Comment: Result not available.         NITRITE UA               nRBC 0       0         Occult Blood UA               Ovalocytes Occasional       Occasional         pH, UA               Platelet Estimate Decreased       Decreased         Platelets 22  Comment: HGB AND PLT critical result(s) called and verbal readback obtained   from BERNARDA HIGHTOWER RN by GERARDO 08/19/2023 16:40         49         Poikilocytosis Moderate       Moderate         Poly Occasional        Occasional         Potassium 3.8       3.0         Preg Test, Ur     Negative     Negative       Product Code   [id number]  [P]             PROTEIN TOTAL 6.9       8.4         Protein, UA               RBC 3.39       3.85         RBC, UA               RDW 29.1       29.7         Schistocytes Present       Present         Sodium 134       131         Specific [place], UA               Specimen Outdate   08/22/2023 23:59             Specimen UA               Teardrop Cells Occasional       Occasional         UNIT NUMBER   [id number]  [P]             UROBILINOGEN UA               WBC, UA               WBC 4.79       6.27                                 [P] - Preliminary Result               Diagnostic Results:  CT: Reviewed  Has not had ultrasound yet    Assessment/Plan:     Renal/  Menorrhagia with regular cycle  Declines pelvic exam today in the ER.    Will await pelvic ultrasound findings and plan further work-up as an outpatient    Hematology  Thrombocytopenia  This can certainly contribute to heavy menstrual bleeding.  Not sure if this has been a chronic problem vs a new acute issue for the patient.    Splenomegaly noted on CT.  Recommend heme/onc consultation    Oncology  * Anemia  Transfuse PRBC's per primary team        Thank you for your consult. I will follow-up with patient. Please contact us if you have any additional questions.    Dana Bobby MD  Obstetrics & Gynecology  [facility] - Emergency Dept.

## 2023-08-19 NOTE — HPI
38F  has a past medical history of Anemia, unspecified, anxiety and depression. Presents to emergency department with fever, diaphoresis, myalgias, trouble breathing. Positive covid exposure on Tuesday. Associated with abdominal pain, sore throat, ear ache. Denies psh gastric bypass. Last bowel movement this am. Has been having 2 menstrual cycles per month. Does not take iron supplements. No longer taking anxiety and depression medication(s).    Initial workup in emergency department revealed fever, tachycardia, and tachypnea. Cbc with significant anemia hb/hct 6.2/23.5 and thrombocytopenia of 49k. Cmp with hyponatremia, hypokalemia, nonanion gap metabolic acidosis, hypomagnesemia. Lactate within normal limits. Influenza and covid negative. Urinalysis with trace leuks and occult blood, 1+. Preg test negative. Chest x-ray unremarkable. Ct abdomen pelvis without contrast with concerns for nonobsructing intussusception, splenomegaly. Us pelvis pending.    Hospital medicine consulted for admission under observation.  Symptomatic anemia, sirs, intussusception

## 2023-08-19 NOTE — ASSESSMENT & PLAN NOTE
Patient has hyponatremia which is controlled,We will aim to correct the sodium by 4-6mEq in 24 hours. We will monitor sodium Every 12 hours. The hyponatremia is due to Dehydration/hypovolemia. We will obtain the following studies: TSH, T4. We will treat the hyponatremia with IV fluids as follows: normal saline. The patient's sodium results have been reviewed and are listed below.  Recent Labs   Lab 08/19/23  1201   *

## 2023-08-19 NOTE — CONSULTS
[facility] - Emergency Dept.  General Surgery  Consult Note    Patient Name: Benny Lorenzo  MRN: [id number]  Code Status: Full Code  Admission Date: 8/19/2023  Hospital Length of Stay: 0 days  Attending Physician: Charleen Blackman MD  Primary Care Provider: Debra Primary Doctor    Patient information was obtained from patient.     Inpatient consult to General Surgery  Consult performed by: Ron Hui MD  Consult ordered by: Charleen Blackman MD        Subjective:     Principal Problem: Anemia    History of Present Illness: 38-year-old female referred for intussusception.  Patient presented with fatigue, weakness, shortness of breath and fever, recent COVID exposure with associated sore throat, abdominal.  She denies any nausea or emesis.  Having regular nonbloody bowel movements.  She underwent CT scan for evaluation noting possible jejunojejunal intussusception.      No current facility-administered medications on file prior to encounter.     Current Outpatient Medications on File Prior to Encounter   Medication Sig    EScitalopram oxalate (LEXAPRO) 10 MG tablet Take 10 mg by mouth once daily.    ferrous sulfate (FEOSOL) 325 mg (65 mg iron) Tab tablet Take 325 mg by mouth with breakfast.    QUEtiapine (SEROQUEL) 50 MG tablet Take 50 mg by mouth every evening.    [DISCONTINUED] HYDROcodone-acetaminophen (NORCO) 5-325 mg per tablet Take 1 tablet by mouth every 6 (six) hours as needed for Pain.    [DISCONTINUED] naproxen (NAPROSYN) 500 MG tablet Take 1 tablet (500 mg total) by mouth 2 (two) times daily with meals.    [DISCONTINUED] ondansetron (ZOFRAN) 4 MG tablet Take 1 tablet (4 mg total) by mouth every 6 (six) hours.    [DISCONTINUED] traMADoL (ULTRAM) 50 mg tablet Take 1 tablet (50 mg total) by mouth every 6 (six) hours as needed for Pain.       Review of patient's allergies indicates:  No Known Allergies    Past Medical History:   Diagnosis Date    Anemia, unspecified      Past Surgical History:   Procedure Laterality  Date     SECTION      x3    TONSILLECTOMY      TUBAL LIGATION Bilateral      Family History    None       Tobacco Use    Smoking status: Never    Smokeless tobacco: Not on file   Substance and Sexual Activity    Alcohol use: Never    Drug use: Not on file    Sexual activity: Yes     Partners: Male     Birth control/protection: See Surgical Hx     Review of Systems   Constitutional:  Negative for chills, fatigue, fever and unexpected weight change.   Respiratory:  Negative for cough, shortness of breath, wheezing and stridor.    Cardiovascular:  Negative for chest pain, palpitations and leg swelling.   Gastrointestinal:  Negative for abdominal distention, abdominal pain, constipation, diarrhea, nausea and vomiting.   Genitourinary:  Negative for difficulty urinating, dysuria, frequency, hematuria and urgency.   Skin:  Negative for color change, pallor, rash and wound.   Hematological:  Does not bruise/bleed easily.     Objective:     Vital Signs (Most Recent):  Temp: 98.3 °F (36.8 °C) (23)  Pulse: 85 (23)  Resp: (!) 23 (23)  BP: 104/66 (23)  SpO2: 100 % (23) Vital Signs (24h Range):  Temp:  [98.2 °F (36.8 °C)-102.9 °F (39.4 °C)] 98.3 °F (36.8 °C)  Pulse:  [] 85  Resp:  [16-30] 23  SpO2:  [98 %-100 %] 100 %  BP: (104-128)/(66-80) 104/66     Weight: 56 kg (123 lb 7.3 oz)  Body mass index is 20.54 kg/m².     Physical Exam  Vitals reviewed.   Constitutional:       Appearance: She is well-developed.   HENT:      Head: Normocephalic and atraumatic.   Cardiovascular:      Rate and Rhythm: Normal rate and regular rhythm.   Pulmonary:      Effort: Pulmonary effort is normal.      Breath sounds: Normal breath sounds.   Abdominal:      General: Bowel sounds are normal. There is no distension.      Palpations: Abdomen is soft.      Tenderness: There is no abdominal tenderness.   Musculoskeletal:      Cervical back: Neck supple.   Skin:     General: Skin is warm  and dry.   Neurological:      Mental Status: She is alert and oriented to person, place, and time.            I have reviewed all pertinent lab results within the past 24 hours.  CBC:   Recent Labs   Lab 08/19/23  1610   WBC 4.79   RBC 3.39*   HGB 5.4*   HCT 20.4*   PLT 22*   MCV 60*   MCH 15.9*   MCHC 26.5*     CMP:   Recent Labs   Lab 08/19/23  1610      CALCIUM 8.4*   ALBUMIN 3.2*   PROT 6.9   *   K 3.8   CO2 21*      BUN 4*   CREATININE 0.8   ALKPHOS 73   ALT 25   AST 15   BILITOT 1.2*       Significant Diagnostics:  CT:  FINDINGS:  Imaging degraded by streak artifact related to extraneous removable lines.  Liver grossly normal size     Spleen enlarged     Gallbladder present     Pancreas without overt pathology.     Borderline right hydronephrosis as visualized with artifact.  Left kidney without hydronephrosis.     Urinary bladder moderately distended.     No overt obstructive bowel findings.  Jejuno jejunal intussusception.  Mid to distal small bowel contains fluid without significant distension.  Proximal colon contains fluid without distension.  Rectal stool present without distension.     Scant ascites     Anemia suggested     Impression:     Anemia, splenomegaly, fullness of the upper right renal collecting system and jejuno jejunal intussusception without proximal obstructive findings    Assessment/Plan:     * Anemia  Undergoing evaluation for anemia/thrombocytopenia  Transfuse PRBCs p.r.n.      Intussusception  CT abdomen pelvis with p.o. contrast for further evaluation   Patient with no symptoms of obstruction or signs clinically of intussusception.  Can possibly be peristalsis/transient intussusception  Further recs to follow upon completion of p.o. contrast CT      VTE Risk Mitigation (From admission, onward)      None            Addendum:  CT:  FINDINGS:  Heart: Normal size as far as seen. No effusion as far as seen.     Lung Bases: Clear.     Liver: Fatty infiltration of liver.   No focal lesions.     Gallbladder: No calcified gallstones.     Bile Ducts: No dilatation.     Pancreas: No mass. No peripancreatic fat stranding.     Spleen: Splenomegaly.     Adrenals: Normal.     Kidneys/Ureters: Right-sided pelviectasis.  The course and contour of the ureter is not well evaluated.     Bladder: No wall thickening.     Reproductive organs: Normal.     GI Tract/Mesentery: No evidence of bowel obstruction or inflammation.  No secondary evidence of acute appendicitis.  The appendix is not identified.     Peritoneal Space: No ascites or free air.     Retroperitoneum: No significant adenopathy.     Abdominal wall: Normal.     Vasculature: No aneurysm.     Bones: No acute fracture. No suspicious lytic or sclerotic lesions.     Impression:     Fatty infiltration of liver.     Splenomegaly     Right-sided renal pelviectasis     Nonvisualization of the appendix      No signs of intussusception on repeat ct. Continue current workup and mgmt  Will sign off    60 minutes of total time spent on the encounter, which includes face to face time and non-face to face time preparing to see the patient (eg, review of tests), Obtaining and/or reviewing separately obtained history, Documenting clinical information in the electronic or other health record, Independently interpreting results (not separately reported) and communicating results to the patient/family/caregiver, or Care coordination (not separately reported).      Thank you for your consult. I will sign off. Please contact us if you have any additional questions.    Ron Hui MD  General Surgery  [facility] - Emergency Dept.

## 2023-08-19 NOTE — ASSESSMENT & PLAN NOTE
CT abdomen pelvis with p.o. contrast for further evaluation   Patient with no symptoms of obstruction or signs clinically of intussusception.  Can possibly be peristalsis/transient intussusception  Further recs to follow upon completion of p.o. contrast CT

## 2023-08-19 NOTE — SUBJECTIVE & OBJECTIVE
No current facility-administered medications on file prior to encounter.     Current Outpatient Medications on File Prior to Encounter   Medication Sig    EScitalopram oxalate (LEXAPRO) 10 MG tablet Take 10 mg by mouth once daily.    ferrous sulfate (FEOSOL) 325 mg (65 mg iron) Tab tablet Take 325 mg by mouth with breakfast.    QUEtiapine (SEROQUEL) 50 MG tablet Take 50 mg by mouth every evening.    [DISCONTINUED] HYDROcodone-acetaminophen (NORCO) 5-325 mg per tablet Take 1 tablet by mouth every 6 (six) hours as needed for Pain.    [DISCONTINUED] naproxen (NAPROSYN) 500 MG tablet Take 1 tablet (500 mg total) by mouth 2 (two) times daily with meals.    [DISCONTINUED] ondansetron (ZOFRAN) 4 MG tablet Take 1 tablet (4 mg total) by mouth every 6 (six) hours.    [DISCONTINUED] traMADoL (ULTRAM) 50 mg tablet Take 1 tablet (50 mg total) by mouth every 6 (six) hours as needed for Pain.       Review of patient's allergies indicates:  No Known Allergies    Past Medical History:   Diagnosis Date    Anemia, unspecified      Past Surgical History:   Procedure Laterality Date     SECTION      x3    TONSILLECTOMY      TUBAL LIGATION Bilateral      Family History    None       Tobacco Use    Smoking status: Never    Smokeless tobacco: Not on file   Substance and Sexual Activity    Alcohol use: Never    Drug use: Not on file    Sexual activity: Yes     Partners: Male     Birth control/protection: See Surgical Hx     Review of Systems   Constitutional:  Negative for chills, fatigue, fever and unexpected weight change.   Respiratory:  Negative for cough, shortness of breath, wheezing and stridor.    Cardiovascular:  Negative for chest pain, palpitations and leg swelling.   Gastrointestinal:  Negative for abdominal distention, abdominal pain, constipation, diarrhea, nausea and vomiting.   Genitourinary:  Negative for difficulty urinating, dysuria, frequency, hematuria and urgency.   Skin:  Negative for color change, pallor,  rash and wound.   Hematological:  Does not bruise/bleed easily.     Objective:     Vital Signs (Most Recent):  Temp: 98.3 °F (36.8 °C) (08/19/23 1713)  Pulse: 85 (08/19/23 1713)  Resp: (!) 23 (08/19/23 1713)  BP: 104/66 (08/19/23 1713)  SpO2: 100 % (08/19/23 1713) Vital Signs (24h Range):  Temp:  [98.2 °F (36.8 °C)-102.9 °F (39.4 °C)] 98.3 °F (36.8 °C)  Pulse:  [] 85  Resp:  [16-30] 23  SpO2:  [98 %-100 %] 100 %  BP: (104-128)/(66-80) 104/66     Weight: 56 kg (123 lb 7.3 oz)  Body mass index is 20.54 kg/m².     Physical Exam  Vitals reviewed.   Constitutional:       Appearance: She is well-developed.   HENT:      Head: Normocephalic and atraumatic.   Cardiovascular:      Rate and Rhythm: Normal rate and regular rhythm.   Pulmonary:      Effort: Pulmonary effort is normal.      Breath sounds: Normal breath sounds.   Abdominal:      General: Bowel sounds are normal. There is no distension.      Palpations: Abdomen is soft.      Tenderness: There is no abdominal tenderness.   Musculoskeletal:      Cervical back: Neck supple.   Skin:     General: Skin is warm and dry.   Neurological:      Mental Status: She is alert and oriented to person, place, and time.            I have reviewed all pertinent lab results within the past 24 hours.  CBC:   Recent Labs   Lab 08/19/23  1610   WBC 4.79   RBC 3.39*   HGB 5.4*   HCT 20.4*   PLT 22*   MCV 60*   MCH 15.9*   MCHC 26.5*     CMP:   Recent Labs   Lab 08/19/23  1610      CALCIUM 8.4*   ALBUMIN 3.2*   PROT 6.9   *   K 3.8   CO2 21*      BUN 4*   CREATININE 0.8   ALKPHOS 73   ALT 25   AST 15   BILITOT 1.2*       Significant Diagnostics:  CT:  FINDINGS:  Imaging degraded by streak artifact related to extraneous removable lines.  Liver grossly normal size     Spleen enlarged     Gallbladder present     Pancreas without overt pathology.     Borderline right hydronephrosis as visualized with artifact.  Left kidney without hydronephrosis.     Urinary bladder  moderately distended.     No overt obstructive bowel findings.  Jejuno jejunal intussusception.  Mid to distal small bowel contains fluid without significant distension.  Proximal colon contains fluid without distension.  Rectal stool present without distension.     Scant ascites     Anemia suggested     Impression:     Anemia, splenomegaly, fullness of the upper right renal collecting system and jejuno jejunal intussusception without proximal obstructive findings

## 2023-08-19 NOTE — PHARMACY MED REC
"Admission Medication History     The home medication history was taken by Salomon Ruiz.    You may go to "Admission" then "Reconcile Home Medications" tabs to review and/or act upon these items.     The home medication list has been updated by the Pharmacy department.   Please read ALL comments highlighted in yellow.   Please address this information as you see fit.    Feel free to contact us if you have any questions or require assistance.      Medications listed below were obtained from: Analytic software- "[employer] [place]" and Medical records  (Not in a hospital admission)        Salomon Ruiz  [id number]    Current Outpatient Medications on File Prior to Encounter   Medication Sig Dispense Refill Last Dose    EScitalopram oxalate (LEXAPRO) 10 MG tablet Take 10 mg by mouth once daily.       ferrous sulfate (FEOSOL) 325 mg (65 mg iron) Tab tablet Take 325 mg by mouth with breakfast.       QUEtiapine (SEROQUEL) 50 MG tablet Take 50 mg by mouth every evening.          .          "

## 2023-08-19 NOTE — ED PROVIDER NOTES
SCRIBE #1 NOTE: I, Ethel Silverio, am scribing for, and in the presence of, Anival Bautista Jr., MD. I have scribed the HPI, ROS, and PEx.     SCRIBE #2 NOTE: I, Chaitanya Antunez, am scribing for, and in the presence of,  Therese Benitez MD. I have scribed the remaining portions of the note not scribed by Scribe #1.      History     Chief Complaint   Patient presents with    COVID-19 Concerns     Covid exposure, chill body aches, hard to breathe, fever at home     Review of patient's allergies indicates:  No Known Allergies      History of Present Illness     HPI    8/19/2023, 12:09 PM  History obtained from the patient      History of Present Illness: Benny Lorenzo is a 38 y.o. female patient with a PMHx of anemia who presents to the Emergency Department for evaluation of chills which onset a few days PTA. Symptoms are constant and moderate in severity. No mitigating or exacerbating factors reported. Associated sxs include abd pain, myalgias, sore throat, SOB, back pain, and ear ache. Pt states she has abd discomfort when she urinates. Pt reports back pain is mitigated by coughing and laying supine. Patient denies any n/v/d, rhinorrhea, CP, and all other sxs at this time. Pt notes having a coworker test positive for COVID-19. No further complaints or concerns at this time.    Arrival mode: Personal vehicle      PCP: No, Primary Doctor        Past Medical History:  Past Medical History:   Diagnosis Date    Anemia, unspecified        Past Surgical History:  Past Surgical History:   Procedure Laterality Date    TONSILLECTOMY      TUBAL LIGATION Bilateral          Family History:  No family history on file.    Social History:  Social History     Tobacco Use    Smoking status: Not on file    Smokeless tobacco: Not on file   Substance and Sexual Activity    Alcohol use: Not on file    Drug use: Not on file    Sexual activity: Not on file        Review of Systems     Review of Systems   Constitutional:  Positive for  chills. Negative for fever.   HENT:  Positive for ear pain and sore throat. Negative for rhinorrhea.    Respiratory:  Positive for shortness of breath.    Cardiovascular:  Negative for chest pain.   Gastrointestinal:  Positive for abdominal pain. Negative for diarrhea, nausea and vomiting.   Genitourinary:  Negative for dysuria.   Musculoskeletal:  Positive for back pain and myalgias.   Skin:  Negative for rash.   Neurological:  Negative for weakness.   Hematological:  Does not bruise/bleed easily.   All other systems reviewed and are negative.       Physical Exam     Initial Vitals [08/19/23 1047]   BP Pulse Resp Temp SpO2   128/80 (!) 125 16 (!) 102.9 °F (39.4 °C) 100 %      MAP       --          Physical Exam   Nursing Notes and Vital Signs Reviewed.  Constitutional: Patient is in no acute distress. Well-developed and well-nourished.  Head: Atraumatic. Normocephalic.  Eyes: PERRL. EOM intact. Conjunctivae are not pale. No scleral icterus.  ENT: Mucous membranes are moist. Oropharynx is clear and symmetric.    Neck: Supple. Full ROM. No lymphadenopathy.  Cardiovascular: Regular rate. Regular rhythm. No murmurs, rubs, or gallops. Distal pulses are 2+ and symmetric.  Pulmonary/Chest: No respiratory distress. Clear to auscultation bilaterally. No wheezing or rales.  Abdominal: Soft and non-distended.  There is no tenderness.  No rebound, guarding, or rigidity. Good bowel sounds.  Genitourinary: No CVA tenderness  Musculoskeletal: Moves all extremities. No obvious deformities. No edema. No calf tenderness.  Skin: Warm and dry.  Neurological:  Alert, awake, and appropriate.  Normal speech.  No acute focal neurological deficits are appreciated.  Psychiatric: Normal affect. Good eye contact. Appropriate in content.     ED Course   Critical Care    Date/Time: 8/19/2023 1:51 PM    Performed by: Anival Bautista Jr., MD  Authorized by: Anival Bautista Jr., MD  Direct patient critical care time: 30 minutes  Additional history  "critical care time: 15 minutes  Ordering / reviewing critical care time: 10 minutes  Documentation critical care time: 10 minutes  Consulting other physicians critical care time: 10 minutes  Total critical care time (exclusive of procedural time) : 75 minutes  Critical care time was exclusive of separately billable procedures and treating other patients and teaching time.  Critical care was necessary to treat or prevent imminent or life-threatening deterioration of the following conditions: symptomatic anemia.  Critical care was time spent personally by me on the following activities: blood draw for specimens, development of treatment plan with patient or surrogate, discussions with consultants, interpretation of cardiac output measurements, evaluation of patient's response to treatment, examination of patient, obtaining history from patient or surrogate, ordering and performing treatments and interventions, ordering and review of laboratory studies, ordering and review of radiographic studies, pulse oximetry, re-evaluation of patient's condition and review of old charts.        ED Vital Signs:  Vitals:    08/19/23 1047 08/19/23 1247 08/19/23 1248 08/19/23 1249   BP: 128/80 118/74 118/74    Pulse: (!) 125 (!) 121 (!) 116 (!) 121   Resp: 16 (!) 30 (!) 30    Temp: (!) 102.9 °F (39.4 °C) (!) 102.4 °F (39.1 °C) (!) 102.4 °F (39.1 °C)    TempSrc: Oral Oral Oral    SpO2: 100%  99%    Weight: 56 kg (123 lb 7.3 oz)      Height: 5' 5" (1.651 m)       08/19/23 1303 08/19/23 1448 08/19/23 1449 08/19/23 1618   BP:  114/73 114/73 119/79   Pulse:  93 93 94   Resp:  (!) 27 (!) 26 (!) 25   Temp: (!) 102.4 °F (39.1 °C) 98.7 °F (37.1 °C) 98.7 °F (37.1 °C) 98.2 °F (36.8 °C)   TempSrc:  Oral Oral Oral   SpO2:   99% 98%   Weight:       Height:        08/19/23 1658   BP: 106/71   Pulse: 91   Resp: (!) 27   Temp: 98.5 °F (36.9 °C)   TempSrc: Oral   SpO2: 100%   Weight:    Height:        Abnormal Lab Results:  Labs Reviewed   CBC W/ AUTO " DIFFERENTIAL - Abnormal; Notable for the following components:       Result Value    RBC 3.85 (*)     Hemoglobin 6.2 (*)     Hematocrit 23.5 (*)     MCV 61 (*)     MCH 16.1 (*)     MCHC 26.4 (*)     RDW 29.7 (*)     Platelets 49 (*)     Lymph # 0.7 (*)     Mono # 1.2 (*)     Lymph % 11.8 (*)     Mono % 18.5 (*)     Platelet Estimate Decreased (*)     All other components within normal limits   COMPREHENSIVE METABOLIC PANEL - Abnormal; Notable for the following components:    Sodium 131 (*)     Potassium 3.0 (*)     CO2 19 (*)     Glucose 126 (*)     BUN 4 (*)     Total Bilirubin 1.3 (*)     All other components within normal limits   URINALYSIS, REFLEX TO URINE CULTURE - Abnormal; Notable for the following components:    Protein, UA 2+ (*)     Occult Blood UA 1+ (*)     Urobilinogen, UA 4.0-6.0 (*)     Leukocytes, UA Trace (*)     All other components within normal limits    Narrative:     Specimen Source->Urine   URINALYSIS MICROSCOPIC - Abnormal; Notable for the following components:    WBC, UA 10 (*)     All other components within normal limits    Narrative:     Specimen Source->Urine   MAGNESIUM - Abnormal; Notable for the following components:    Magnesium 1.4 (*)     All other components within normal limits   COMPREHENSIVE METABOLIC PANEL - Abnormal; Notable for the following components:    Sodium 134 (*)     CO2 21 (*)     BUN 4 (*)     Calcium 8.4 (*)     Albumin 3.2 (*)     Total Bilirubin 1.2 (*)     All other components within normal limits    Narrative:     12 hours after arrival to the ED   CBC W/ AUTO DIFFERENTIAL - Abnormal; Notable for the following components:    RBC 3.39 (*)     Hemoglobin 5.4 (*)     Hematocrit 20.4 (*)     MCV 60 (*)     MCH 15.9 (*)     MCHC 26.5 (*)     RDW 29.1 (*)     Platelets 22 (*)     Mono % 19.2 (*)     All other components within normal limits    Narrative:     12 hours after arrival to the ED    HGB AND PLT critical result(s) called and verbal readback obtained    from BERNARDA HIGHTOWER RN by GERARDO 08/19/2023 16:40   INFLUENZA A & B BY MOLECULAR   CULTURE, BLOOD   CULTURE, BLOOD   SARS-COV-2 RNA AMPLIFICATION, QUAL   LACTIC ACID, PLASMA   LACTIC ACID, PLASMA   PREGNANCY TEST, URINE RAPID    Narrative:     Specimen Source->Urine   PREGNANCY TEST, URINE RAPID    Narrative:     Specimen Source->Urine   MAGNESIUM    Narrative:     12 hours after arrival to the ED   FERRITIN    Narrative:     12 hours after arrival to the ED   IRON AND TIBC   TYPE & SCREEN   PREPARE RBC SOFT        All Lab Results:  Results for orders placed or performed during the hospital encounter of 08/19/23   Influenza A & B by Molecular    Specimen: Nasopharyngeal Swab   Result Value Ref Range    Influenza A, Molecular Negative Negative    Influenza B, Molecular Negative Negative    Flu A & B Source Nasal swab    COVID-19 Rapid Screening   Result Value Ref Range    SARS-CoV-2 RNA, Amplification, Qual Negative Negative   CBC auto differential   Result Value Ref Range    WBC 6.27 3.90 - 12.70 K/uL    RBC 3.85 (L) 4.00 - 5.40 M/uL    Hemoglobin 6.2 (L) 12.0 - 16.0 g/dL    Hematocrit 23.5 (L) 37.0 - 48.5 %    MCV 61 (L) 82 - 98 fL    MCH 16.1 (L) 27.0 - 31.0 pg    MCHC 26.4 (L) 32.0 - 36.0 g/dL    RDW 29.7 (H) 11.5 - 14.5 %    Platelets 49 (L) 150 - 450 K/uL    MPV SEE COMMENT 9.2 - 12.9 fL    Immature Granulocytes 0.5 0.0 - 0.5 %    Gran # (ANC) 4.2 1.8 - 7.7 K/uL    Immature Grans (Abs) 0.03 0.00 - 0.04 K/uL    Lymph # 0.7 (L) 1.0 - 4.8 K/uL    Mono # 1.2 (H) 0.3 - 1.0 K/uL    Eos # 0.2 0.0 - 0.5 K/uL    Baso # 0.04 0.00 - 0.20 K/uL    nRBC 0 0 /100 WBC    Gran % 66.2 38.0 - 73.0 %    Lymph % 11.8 (L) 18.0 - 48.0 %    Mono % 18.5 (H) 4.0 - 15.0 %    Eosinophil % 2.4 0.0 - 8.0 %    Basophil % 0.6 0.0 - 1.9 %    Platelet Estimate Decreased (A)     Aniso Moderate     Poik Moderate     Poly Occasional     Hypo Occasional     Ovalocytes Occasional     Tear Drop Cells Occasional     Schistocytes Present      Differential Method Automated    Comprehensive metabolic panel   Result Value Ref Range    Sodium 131 (L) 136 - 145 mmol/L    Potassium 3.0 (L) 3.5 - 5.1 mmol/L    Chloride 100 95 - 110 mmol/L    CO2 19 (L) 23 - 29 mmol/L    Glucose 126 (H) 70 - 110 mg/dL    BUN 4 (L) 6 - 20 mg/dL    Creatinine 0.9 0.5 - 1.4 mg/dL    Calcium 8.9 8.7 - 10.5 mg/dL    Total Protein 8.4 6.0 - 8.4 g/dL    Albumin 3.9 3.5 - 5.2 g/dL    Total Bilirubin 1.3 (H) 0.1 - 1.0 mg/dL    Alkaline Phosphatase 88 55 - 135 U/L    AST 20 10 - 40 U/L    ALT 33 10 - 44 U/L    eGFR >60 >60 mL/min/1.73 m^2    Anion Gap 12 8 - 16 mmol/L   Lactic acid, plasma #1   Result Value Ref Range    Lactate (Lactic Acid) 2.0 0.5 - 2.2 mmol/L   Lactic acid, plasma #2   Result Value Ref Range    Lactate (Lactic Acid) 1.2 0.5 - 2.2 mmol/L   Urinalysis, Reflex to Urine Culture Urine, Clean Catch    Specimen: Urine   Result Value Ref Range    Specimen UA Urine, Clean Catch     Color, UA Yellow Yellow, Straw, Lynne    Appearance, UA Clear Clear    pH, UA 6.0 5.0 - 8.0    Specific Gravity, UA 1.015 1.005 - 1.030    Protein, UA 2+ (A) Negative    Glucose, UA Negative Negative    Ketones, UA Negative Negative    Bilirubin (UA) Negative Negative    Occult Blood UA 1+ (A) Negative    Nitrite, UA Negative Negative    Urobilinogen, UA 4.0-6.0 (A) <2.0 EU/dL    Leukocytes, UA Trace (A) Negative   Pregnancy, urine rapid   Result Value Ref Range    Preg Test, Ur Negative    Urinalysis Microscopic   Result Value Ref Range    RBC, UA 3 0 - 4 /hpf    WBC, UA 10 (H) 0 - 5 /hpf    Bacteria Rare None-Occ /hpf    Hyaline Casts, UA 0 0-1/lpf /lpf    Microscopic Comment SEE COMMENT    Pregnancy, urine rapid   Result Value Ref Range    Preg Test, Ur Negative    Magnesium   Result Value Ref Range    Magnesium 1.4 (L) 1.6 - 2.6 mg/dL   Comprehensive metabolic panel   Result Value Ref Range    Sodium 134 (L) 136 - 145 mmol/L    Potassium 3.8 3.5 - 5.1 mmol/L    Chloride 102 95 - 110 mmol/L    CO2  21 (L) 23 - 29 mmol/L    Glucose 110 70 - 110 mg/dL    BUN 4 (L) 6 - 20 mg/dL    Creatinine 0.8 0.5 - 1.4 mg/dL    Calcium 8.4 (L) 8.7 - 10.5 mg/dL    Total Protein 6.9 6.0 - 8.4 g/dL    Albumin 3.2 (L) 3.5 - 5.2 g/dL    Total Bilirubin 1.2 (H) 0.1 - 1.0 mg/dL    Alkaline Phosphatase 73 55 - 135 U/L    AST 15 10 - 40 U/L    ALT 25 10 - 44 U/L    eGFR >60 >60 mL/min/1.73 m^2    Anion Gap 11 8 - 16 mmol/L   Magnesium   Result Value Ref Range    Magnesium 2.5 1.6 - 2.6 mg/dL   CBC auto differential   Result Value Ref Range    WBC 4.79 3.90 - 12.70 K/uL    RBC 3.39 (L) 4.00 - 5.40 M/uL    Hemoglobin 5.4 (LL) 12.0 - 16.0 g/dL    Hematocrit 20.4 (L) 37.0 - 48.5 %    MCV 60 (L) 82 - 98 fL    MCH 15.9 (L) 27.0 - 31.0 pg    MCHC 26.5 (L) 32.0 - 36.0 g/dL    RDW 29.1 (H) 11.5 - 14.5 %    Platelets 22 (LL) 150 - 450 K/uL    MPV SEE COMMENT 9.2 - 12.9 fL    Immature Granulocytes 0.4 0.0 - 0.5 %    Gran # (ANC) 2.8 1.8 - 7.7 K/uL    Immature Grans (Abs) 0.02 0.00 - 0.04 K/uL    Lymph # 1.0 1.0 - 4.8 K/uL    Mono # 0.9 0.3 - 1.0 K/uL    Eos # 0.0 0.0 - 0.5 K/uL    Baso # 0.03 0.00 - 0.20 K/uL    nRBC 0 0 /100 WBC    Gran % 58.5 38.0 - 73.0 %    Lymph % 21.1 18.0 - 48.0 %    Mono % 19.2 (H) 4.0 - 15.0 %    Eosinophil % 0.2 0.0 - 8.0 %    Basophil % 0.6 0.0 - 1.9 %    Differential Method Automated    Ferritin   Result Value Ref Range    Ferritin 44 20.0 - 300.0 ng/mL   Type & Screen   Result Value Ref Range    Group & Rh B POS     Indirect Nohemy NEG     Specimen Outdate 08/22/2023 23:59    Prepare RBC 1 Unit   Result Value Ref Range    UNIT NUMBER [id number]     Product Code [id number]     DISPENSE STATUS ISSUED     CODING SYSTEM TRKY093     Unit Blood Type Code 7300     Unit Blood Type B POS     Unit Expiration [id number]     CROSSMATCH INTERPRETATION Compatible        Imaging Results:  Imaging Results              US Pelvis Comp with Transvag NON-OB (xpd (In process)                      CT Abdomen Pelvis  Without Contrast  (Final result)  Result time 08/19/23 15:28:39      Final result by Mirela Greenfield MD (08/19/23 15:28:39)                   Impression:      Anemia, splenomegaly, fullness of the upper right renal collecting system and jejuno jejunal intussusception without proximal obstructive findings      Electronically signed by: Mirela Greenfield  Date:    08/19/2023  Time:    15:28               Narrative:    EXAMINATION:  CT ABDOMEN PELVIS WITHOUT CONTRAST    CLINICAL HISTORY:  Abdominal abscess/infection suspected;    TECHNIQUE:  Low dose axial images, sagittal and coronal reformations were obtained from the lung bases to the pubic symphysis.  Contrast was not administered.    COMPARISON:  None    FINDINGS:  Imaging degraded by streak artifact related to extraneous removable lines.  Liver grossly normal size    Spleen enlarged    Gallbladder present    Pancreas without overt pathology.    Borderline right hydronephrosis as visualized with artifact.  Left kidney without hydronephrosis.    Urinary bladder moderately distended.    No overt obstructive bowel findings.  Jejuno jejunal intussusception.  Mid to distal small bowel contains fluid without significant distension.  Proximal colon contains fluid without distension.  Rectal stool present without distension.    Scant ascites    Anemia suggested                                       X-Ray Chest AP Portable (Final result)  Result time 08/19/23 12:48:09      Final result by Scout Pastrana III, MD (08/19/23 12:48:09)                   Impression:      No acute abnormality.      Electronically signed by: El Pastrana  Date:    08/19/2023  Time:    12:48               Narrative:    EXAMINATION:  XR CHEST AP PORTABLE    CLINICAL HISTORY:  Sepsis;.    TECHNIQUE:  Single frontal portable view of the chest was performed.    COMPARISON:  None    FINDINGS:  Support devices: None    The lungs are clear, with normal appearance of pulmonary vasculature and no pleural  effusion or pneumothorax.    The cardiac silhouette is normal in size. The hilar and mediastinal contours are unremarkable.    Bones are intact.                                       The EKG was ordered, reviewed, and independently interpreted by the ED provider.  Interpretation time: 15:28  Rate: 91 BPM  Rhythm: normal sinus rhythm  Interpretation: Normal ECG. No STEMI.           The Emergency Provider reviewed the vital signs and test results, which are outlined above.     ED Discussion     2:17 PM: Discussed pt's case with Dr. Joana Steward MD ([facility]) who recommends consulting Gynecology, getting a CT abdomen, and not giving additional Motrin.     3:54 PM: Discussed pt's case with Dr. Ron Hui MD (General Surgery) who recommends repeating CT abdomen/pelvis with PO contrast.    4:00 PM: Dr. Bautista transfers care of patient to Dr. Benitez pending imaging results.    5:00 PM: Discussed case with Dr. Joana Steward MD ([facility]). Dr. Charleen Blackman MD agrees with current care and management of pt and accepts admission.   Admitting Service: Hospital Medicine  Admitting Physician: Dr. Blackman  Admit to: Obs Med Tele    5:04 PM: Re-evaluated pt. I have discussed test results, shared treatment plan, and the need for admission with patient and family at bedside. Pt and family express understanding at this time and agree with all information. All questions answered. Pt and family have no further questions or concerns at this time. Pt is ready for admit.       Medical Decision Making:   Clinical Tests:   Lab Tests: Ordered and Reviewed  Radiological Study: Ordered and Reviewed  Medical Tests: Ordered and Reviewed           ED Medication(s):  Medications   0.9%  NaCl infusion (for blood administration) (has no administration in time range)   ferrous sulfate tablet 1 each (has no administration in time range)   EScitalopram oxalate tablet 10 mg (has no administration in time range)   QUEtiapine tablet  50 mg (has no administration in time range)   sodium chloride 0.9% flush 10 mL (has no administration in time range)   acetaminophen tablet 1,000 mg (1,000 mg Oral Given 8/19/23 1112)   lactated ringers bolus 1,680 mL (0 mLs Intravenous Stopped 8/19/23 1655)   ibuprofen tablet 600 mg (600 mg Oral Given 8/19/23 1303)   potassium bicarbonate disintegrating tablet 40 mEq (40 mEq Oral Given 8/19/23 1355)   potassium chloride 10 mEq in 100 mL IVPB (0 mEq Intravenous Stopped 8/19/23 1657)   cefTRIAXone (ROCEPHIN) 1 g in dextrose 5 % in water (D5W) 100 mL IVPB (MB+) (0 g Intravenous Stopped 8/19/23 1426)   magnesium sulfate 2g in water 50mL IVPB (premix) (0 g Intravenous Stopped 8/19/23 1658)       New Prescriptions    No medications on file           Scribe Attestation:   Scribe #1: I performed the above scribed service and the documentation accurately describes the services I performed. I attest to the accuracy of the note.     Attending:   Physician Attestation Statement for Scribe #1: I, Anival Bautista Jr., MD, personally performed the services described in this documentation, as scribed by Ethel Silverio, in my presence, and it is both accurate and complete.       Scribe Attestation:   Scribe #2: I performed the above scribed service and the documentation accurately describes the services I performed. I attest to the accuracy of the note.    Attending Attestation:           Physician Attestation for Scribe:    Physician Attestation Statement for Scribe #2: I, Therese Benitez MD, reviewed documentation, as scribed by Chaitanya Antunez in my presence, and it is both accurate and complete. I also acknowledge and confirm the content of the note done by Junie #1.           Clinical Impression       ICD-10-CM ICD-9-CM   1. Hypokalemia  E87.6 276.8   2. SOB (shortness of breath)  R06.02 786.05   3. Symptomatic anemia  D64.9 285.9   4. Urinary tract infection without hematuria, site unspecified  N39.0 599.0   5. Upper  respiratory tract infection, unspecified type  J06.9 465.9   6. Hypomagnesemia  E83.42 275.2       Disposition:   Disposition: Placed in Observation  Condition: Therese Mcdonald MD  08/20/23 1702

## 2023-08-19 NOTE — H&P
Former[facility] - Emergency Dept.  [facility] Medicine  History & Physical    Patient Name: Benny Lorenzo  MRN: [id number]  Patient Class: OP- Observation  Admission Date: 8/19/2023  Attending Physician: Charleen Blackman MD   Primary Care Provider: Debra Primary Doctor         Patient information was obtained from patient and ER records.     Subjective:     Principal Problem:Anemia    Chief Complaint:   Chief Complaint   Patient presents with    COVID-19 Concerns     Covid exposure, chill body aches, hard to breathe, fever at home        HPI: 38F  has a past medical history of Anemia, unspecified, anxiety and depression. Presents to emergency department with fever, diaphoresis, myalgias, trouble breathing. Positive covid exposure on Tuesday. Associated with abdominal pain, sore throat, ear ache. Denies psh gastric bypass. Last bowel movement this am. Has been having 2 menstrual cycles per month. Does not take iron supplements. No longer taking anxiety and depression medication(s).    Initial workup in emergency department revealed fever, tachycardia, and tachypnea. Cbc with significant anemia hb/hct 6.2/23.5 and thrombocytopenia of 49k. Cmp with hyponatremia, hypokalemia, nonanion gap metabolic acidosis, hypomagnesemia. Lactate within normal limits. Influenza and covid negative. Urinalysis with trace leuks and occult blood, 1+. Preg test negative. Chest x-ray unremarkable. Ct abdomen pelvis without contrast with concerns for nonobsructing intussusception, splenomegaly. Us pelvis pending.    Hospital medicine consulted for admission under observation.  Symptomatic anemia, sirs, intussusception       Past Medical History:   Diagnosis Date    Anemia, unspecified        Past Surgical History:   Procedure Laterality Date    TONSILLECTOMY      TUBAL LIGATION Bilateral        Review of patient's allergies indicates:  No Known Allergies    No current facility-administered medications on file prior to encounter.     Current Outpatient  Medications on File Prior to Encounter   Medication Sig    ferrous sulfate (FEOSOL) 325 mg (65 mg iron) Tab tablet Take 325 mg by mouth with breakfast.    LEXAPRO 10 mg tablet Take 10 mg by mouth.    SEROQUEL 50 mg tablet Take 50 mg by mouth every evening.    [DISCONTINUED] HYDROcodone-acetaminophen (NORCO) 5-325 mg per tablet Take 1 tablet by mouth every 6 (six) hours as needed for Pain.    [DISCONTINUED] naproxen (NAPROSYN) 500 MG tablet Take 1 tablet (500 mg total) by mouth 2 (two) times daily with meals.    [DISCONTINUED] ondansetron (ZOFRAN) 4 MG tablet Take 1 tablet (4 mg total) by mouth every 6 (six) hours.    [DISCONTINUED] traMADoL (ULTRAM) 50 mg tablet Take 1 tablet (50 mg total) by mouth every 6 (six) hours as needed for Pain.     Family History    None       Tobacco Use    Smoking status: Not on file    Smokeless tobacco: Not on file   Substance and Sexual Activity    Alcohol use: Not on file    Drug use: Not on file    Sexual activity: Not on file     Review of Systems   Constitutional:  Positive for activity change, appetite change, diaphoresis, fatigue, fever and unexpected weight change.   Respiratory:  Positive for shortness of breath.    Cardiovascular:  Negative for chest pain and leg swelling.   Gastrointestinal:  Positive for abdominal pain and nausea. Negative for vomiting.   Genitourinary:  Positive for menstrual problem.   Musculoskeletal:  Positive for back pain.   Neurological:  Positive for weakness.   Psychiatric/Behavioral:  Negative for agitation, behavioral problems, confusion, decreased concentration and dysphoric mood. The patient is not nervous/anxious.      Objective:     Vital Signs (Most Recent):  Temp: 98.7 °F (37.1 °C) (08/19/23 1449)  Pulse: 93 (08/19/23 1449)  Resp: (!) 26 (08/19/23 1449)  BP: 114/73 (08/19/23 1449)  SpO2: 99 % (08/19/23 1449) Vital Signs (24h Range):  Temp:  [98.7 °F (37.1 °C)-102.9 °F (39.4 °C)] 98.7 °F (37.1 °C)  Pulse:  [] 93  Resp:   [16-30] 26  SpO2:  [99 %-100 %] 99 %  BP: (114-128)/(73-80) 114/73     Weight: 56 kg (123 lb 7.3 oz)  Body mass index is 20.54 kg/m².     Physical Exam  Vitals and nursing note reviewed. Exam conducted with a chaperone present (nursing, visitor).   Constitutional:       General: She is not in acute distress.     Appearance: She is underweight. She is ill-appearing and diaphoretic. She is not toxic-appearing.   HENT:      Head: Normocephalic and atraumatic.   Cardiovascular:      Rate and Rhythm: Normal rate.   Pulmonary:      Effort: Pulmonary effort is normal. No respiratory distress.   Abdominal:      Palpations: Abdomen is soft.      Tenderness: There is abdominal tenderness.   Musculoskeletal:      Right lower leg: No edema.      Left lower leg: No edema.   Skin:     General: Skin is warm.      Capillary Refill: Capillary refill takes 2 to 3 seconds.   Neurological:      Mental Status: She is alert and oriented to person, place, and time.   Psychiatric:         Mood and Affect: Mood normal. Affect is flat.         Behavior: Behavior normal. Behavior is cooperative.                Significant Labs: All pertinent labs within the past 24 hours have been reviewed.  Blood Culture: pending    CBC:   Recent Labs   Lab 08/19/23  1201   WBC 6.27   HGB 6.2*   HCT 23.5*   PLT 49*     CMP:   Recent Labs   Lab 08/19/23  1201   *   K 3.0*      CO2 19*   *   BUN 4*   CREATININE 0.9   CALCIUM 8.9   PROT 8.4   ALBUMIN 3.9   BILITOT 1.3*   ALKPHOS 88   AST 20   ALT 33   ANIONGAP 12     Lactic Acid:   Recent Labs   Lab 08/19/23  1201 08/19/23  1354   LACTATE 2.0 1.2     Magnesium:   Recent Labs   Lab 08/19/23  1354   MG 1.4*     Urine Studies:   Recent Labs   Lab 08/19/23  1126   COLORU Yellow   APPEARANCEUA Clear   PHUR 6.0   SPECGRAV 1.015   PROTEINUA 2+*   GLUCUA Negative   KETONESU Negative   BILIRUBINUA Negative   OCCULTUA 1+*   NITRITE Negative   UROBILINOGEN 4.0-6.0*   LEUKOCYTESUR Trace*   RBCUA 3    WBCUA 10*   BACTERIA Rare   HYALINECASTS 0       Significant Imaging: I have reviewed all pertinent imaging results/findings within the past 24 hours.  CT: I have reviewed all pertinent results/findings within the past 24 hours and my personal findings are:  abdomen pelvis wo contrast intussusception, splenomegaly  CXR: I have reviewed all pertinent results/findings within the past 24 hours and my personal findings are:  no acute findings  U/S: I have reviewed all pertinent results/findings within the past 24 hours and my personal findings are:  pending    Assessment/Plan:     * Anemia  Transfuse blood  Repeat/trend hb/hct   Source of bleed? Menorrhagia vs GI bleed       Intussusception  Associated with abdominal pain  Surgery consulted  Repeat ct abdomen pelvis with po contrast  npo      Hypokalemia  hypomg   replete      Hyponatremia  Patient has hyponatremia which is controlled,We will aim to correct the sodium by 4-6mEq in 24 hours. We will monitor sodium Every 12 hours. The hyponatremia is due to Dehydration/hypovolemia. We will obtain the following studies: TSH, T4. We will treat the hyponatremia with IV fluids as follows: normal saline. The patient's sodium results have been reviewed and are listed below.  Recent Labs   Lab 08/19/23  1201   *       Exposure to COVID-19 virus  covid negative but symptomatic with fever, dyspnea, myalgias  Supportive measures      SIRS (systemic inflammatory response syndrome)  Based on tachycardia, tachypnea, fever  Sofa 1  Received rocephin in emergency department  Source control  Continue intravenous rocephin      VTE Risk Mitigation (From admission, onward)    None                     Charleen Blackman MD  Department of Hospital Medicine  '[place] - Emergency Dept.

## 2023-08-20 PROBLEM — A41.9 SEPSIS: Status: ACTIVE | Noted: 2023-08-19

## 2023-08-20 PROBLEM — R78.81 E COLI BACTEREMIA: Status: ACTIVE | Noted: 2023-08-20

## 2023-08-20 PROBLEM — B96.20 E COLI BACTEREMIA: Status: ACTIVE | Noted: 2023-08-20

## 2023-08-20 LAB
ACINETOBACTER CALCOACETICUS/BAUMANNII COMPLEX: NOT DETECTED
BACTEROIDES FRAGILIS: NOT DETECTED
BASOPHILS # BLD AUTO: 0.01 K/UL (ref 0–0.2)
BASOPHILS # BLD AUTO: 0.02 K/UL (ref 0–0.2)
BASOPHILS NFR BLD: 0.3 % (ref 0–1.9)
BASOPHILS NFR BLD: 0.5 % (ref 0–1.9)
BLD PROD TYP BPU: NORMAL
BLOOD UNIT EXPIRATION DATE: NORMAL
BLOOD UNIT TYPE CODE: 7300
BLOOD UNIT TYPE: NORMAL
CANDIDA ALBICANS: NOT DETECTED
CANDIDA AURIS: NOT DETECTED
CANDIDA GLABRATA: NOT DETECTED
CANDIDA KRUSEI: NOT DETECTED
CANDIDA PARAPSILOSIS: NOT DETECTED
CANDIDA TROPICALIS: NOT DETECTED
CODING SYSTEM: NORMAL
CROSSMATCH INTERPRETATION: NORMAL
CRYPTOCOCCUS NEOFORMANS/GATTII: NOT DETECTED
CTX-M GENE: NOT DETECTED
DIFFERENTIAL METHOD: ABNORMAL
DIFFERENTIAL METHOD: ABNORMAL
DISPENSE STATUS: NORMAL
ENTEROBACTER CLOACAE COMPLEX: NOT DETECTED
ENTEROBACTERALES: ABNORMAL
ENTEROCOCCUS FAECALIS: NOT DETECTED
ENTEROCOCCUS FAECIUM: NOT DETECTED
EOSINOPHIL # BLD AUTO: 0 K/UL (ref 0–0.5)
EOSINOPHIL # BLD AUTO: 0 K/UL (ref 0–0.5)
EOSINOPHIL NFR BLD: 0.2 % (ref 0–8)
EOSINOPHIL NFR BLD: 0.3 % (ref 0–8)
ERYTHROCYTE [DISTWIDTH] IN BLOOD BY AUTOMATED COUNT: 33 % (ref 11.5–14.5)
ERYTHROCYTE [DISTWIDTH] IN BLOOD BY AUTOMATED COUNT: 33.1 % (ref 11.5–14.5)
ESCHERICHIA COLI: DETECTED
HAEMOPHILUS INFLUENZAE: NOT DETECTED
HCT VFR BLD AUTO: 24.5 % (ref 37–48.5)
HCT VFR BLD AUTO: 24.7 % (ref 37–48.5)
HGB BLD-MCNC: 6.8 G/DL (ref 12–16)
HGB BLD-MCNC: 6.8 G/DL (ref 12–16)
IMM GRANULOCYTES # BLD AUTO: 0.02 K/UL (ref 0–0.04)
IMM GRANULOCYTES # BLD AUTO: 0.02 K/UL (ref 0–0.04)
IMM GRANULOCYTES NFR BLD AUTO: 0.5 % (ref 0–0.5)
IMM GRANULOCYTES NFR BLD AUTO: 0.5 % (ref 0–0.5)
IMP GENE: NOT DETECTED
IRON SERPL-MCNC: 18 UG/DL (ref 30–160)
KLEBSIELLA AEROGENES: NOT DETECTED
KLEBSIELLA OXYTOCA: NOT DETECTED
KLEBSIELLA PNEUMONIAE GROUP: NOT DETECTED
KPC: NOT DETECTED
LISTERIA MONOCYTOGENES: NOT DETECTED
LYMPHOCYTES # BLD AUTO: 0.6 K/UL (ref 1–4.8)
LYMPHOCYTES # BLD AUTO: 0.8 K/UL (ref 1–4.8)
LYMPHOCYTES NFR BLD: 16.1 % (ref 18–48)
LYMPHOCYTES NFR BLD: 18.2 % (ref 18–48)
MCH RBC QN AUTO: 18.5 PG (ref 27–31)
MCH RBC QN AUTO: 18.5 PG (ref 27–31)
MCHC RBC AUTO-ENTMCNC: 27.5 G/DL (ref 32–36)
MCHC RBC AUTO-ENTMCNC: 27.8 G/DL (ref 32–36)
MCR-1: NOT DETECTED
MCV RBC AUTO: 67 FL (ref 82–98)
MCV RBC AUTO: 67 FL (ref 82–98)
MEC A/C AND MREJ (MRSA): ABNORMAL
MEC A/C: ABNORMAL
MONOCYTES # BLD AUTO: 0.7 K/UL (ref 0.3–1)
MONOCYTES # BLD AUTO: 0.7 K/UL (ref 0.3–1)
MONOCYTES NFR BLD: 17.1 % (ref 4–15)
MONOCYTES NFR BLD: 18.4 % (ref 4–15)
MPV, BLUE TOP: ABNORMAL FL (ref 9.2–12.9)
NDM GENE: NOT DETECTED
NEISSERIA MENINGITIDIS: NOT DETECTED
NEUTROPHILS # BLD AUTO: 2.6 K/UL (ref 1.8–7.7)
NEUTROPHILS # BLD AUTO: 2.8 K/UL (ref 1.8–7.7)
NEUTROPHILS NFR BLD: 63.5 % (ref 38–73)
NEUTROPHILS NFR BLD: 64.4 % (ref 38–73)
NRBC BLD-RTO: 0 /100 WBC
NRBC BLD-RTO: 0 /100 WBC
OXA-48-LIKE: NOT DETECTED
PATH REV BLD -IMP: NORMAL
PLATELET # BLD AUTO: 23 K/UL (ref 150–450)
PLATELET # BLD AUTO: 24 K/UL (ref 150–450)
PLATELET, BLUE TOP: 45 K/UL (ref 150–450)
PMV BLD AUTO: ABNORMAL FL (ref 9.2–12.9)
PMV BLD AUTO: ABNORMAL FL (ref 9.2–12.9)
PROTEUS SPECIES: NOT DETECTED
PSEUDOMONAS AERUGINOSA: NOT DETECTED
RBC # BLD AUTO: 3.68 M/UL (ref 4–5.4)
RBC # BLD AUTO: 3.68 M/UL (ref 4–5.4)
SALMONELLA SP: NOT DETECTED
SATURATED IRON: 5 % (ref 20–50)
SERRATIA MARCESCENS: NOT DETECTED
STAPHYLOCOCCUS AUREUS: NOT DETECTED
STAPHYLOCOCCUS EPIDERMIDIS: NOT DETECTED
STAPHYLOCOCCUS LUGDUNESIS: NOT DETECTED
STAPHYLOCOCCUS SPECIES: NOT DETECTED
STENOTROPHOMONAS MALTOPHILIA: NOT DETECTED
STREPTOCOCCUS AGALACTIAE: NOT DETECTED
STREPTOCOCCUS PNEUMONIAE: NOT DETECTED
STREPTOCOCCUS PYOGENES: NOT DETECTED
STREPTOCOCCUS SPECIES: NOT DETECTED
TOTAL IRON BINDING CAPACITY: 363 UG/DL (ref 250–450)
TRANSFERRIN SERPL-MCNC: 245 MG/DL (ref 200–375)
UNIT NUMBER: NORMAL
VAN A/B: ABNORMAL
VIM GENE: NOT DETECTED
WBC # BLD AUTO: 3.97 K/UL (ref 3.9–12.7)
WBC # BLD AUTO: 4.33 K/UL (ref 3.9–12.7)

## 2023-08-20 PROCEDURE — 36415 COLL VENOUS BLD VENIPUNCTURE: CPT | Performed by: NURSE PRACTITIONER

## 2023-08-20 PROCEDURE — 85049 AUTOMATED PLATELET COUNT: CPT | Performed by: FAMILY MEDICINE

## 2023-08-20 PROCEDURE — 85025 COMPLETE CBC W/AUTO DIFF WBC: CPT | Performed by: NURSE PRACTITIONER

## 2023-08-20 PROCEDURE — 96375 TX/PRO/DX INJ NEW DRUG ADDON: CPT

## 2023-08-20 PROCEDURE — 85025 COMPLETE CBC W/AUTO DIFF WBC: CPT | Mod: 91 | Performed by: FAMILY MEDICINE

## 2023-08-20 PROCEDURE — 63600175 PHARM REV CODE 636 W HCPCS: Performed by: FAMILY MEDICINE

## 2023-08-20 PROCEDURE — 99222 PR INITIAL HOSPITAL CARE,LEVL II: ICD-10-PCS | Mod: ,,, | Performed by: INTERNAL MEDICINE

## 2023-08-20 PROCEDURE — 25000003 PHARM REV CODE 250: Performed by: NURSE PRACTITIONER

## 2023-08-20 PROCEDURE — 25000003 PHARM REV CODE 250: Performed by: FAMILY MEDICINE

## 2023-08-20 PROCEDURE — 85060 BLOOD SMEAR INTERPRETATION: CPT | Mod: ,,, | Performed by: PATHOLOGY

## 2023-08-20 PROCEDURE — 27000207 HC ISOLATION

## 2023-08-20 PROCEDURE — 21400001 HC TELEMETRY ROOM

## 2023-08-20 PROCEDURE — 25000003 PHARM REV CODE 250: Performed by: HOSPITALIST

## 2023-08-20 PROCEDURE — 85060 PATHOLOGIST REVIEW: ICD-10-PCS | Mod: ,,, | Performed by: PATHOLOGY

## 2023-08-20 PROCEDURE — 99231 SBSQ HOSP IP/OBS SF/LOW 25: CPT | Mod: ,,, | Performed by: OBSTETRICS & GYNECOLOGY

## 2023-08-20 PROCEDURE — P9035 PLATELET PHERES LEUKOREDUCED: HCPCS | Performed by: FAMILY MEDICINE

## 2023-08-20 PROCEDURE — 99231 PR SUBSEQUENT HOSPITAL CARE,LEVL I: ICD-10-PCS | Mod: ,,, | Performed by: OBSTETRICS & GYNECOLOGY

## 2023-08-20 PROCEDURE — 99222 1ST HOSP IP/OBS MODERATE 55: CPT | Mod: ,,, | Performed by: INTERNAL MEDICINE

## 2023-08-20 PROCEDURE — 36415 COLL VENOUS BLD VENIPUNCTURE: CPT | Performed by: FAMILY MEDICINE

## 2023-08-20 RX ORDER — MEDROXYPROGESTERONE ACETATE 10 MG/1
10 TABLET ORAL DAILY
Qty: 14 TABLET | Refills: 0 | Status: SHIPPED | OUTPATIENT
Start: 2023-08-20 | End: 2023-09-03

## 2023-08-20 RX ORDER — HYDROCODONE BITARTRATE AND ACETAMINOPHEN 500; 5 MG/1; MG/1
TABLET ORAL
Status: DISCONTINUED | OUTPATIENT
Start: 2023-08-20 | End: 2023-08-23 | Stop reason: HOSPADM

## 2023-08-20 RX ORDER — IBUPROFEN 400 MG/1
400 TABLET ORAL ONCE
Status: COMPLETED | OUTPATIENT
Start: 2023-08-20 | End: 2023-08-20

## 2023-08-20 RX ORDER — ACETAMINOPHEN 325 MG/1
650 TABLET ORAL EVERY 6 HOURS PRN
Status: DISCONTINUED | OUTPATIENT
Start: 2023-08-20 | End: 2023-08-23 | Stop reason: HOSPADM

## 2023-08-20 RX ORDER — ACETAMINOPHEN 325 MG/1
650 TABLET ORAL EVERY 6 HOURS PRN
Status: DISCONTINUED | OUTPATIENT
Start: 2023-08-20 | End: 2023-08-20 | Stop reason: SDUPTHER

## 2023-08-20 RX ADMIN — CEFTRIAXONE 2 G: 2 INJECTION, POWDER, FOR SOLUTION INTRAMUSCULAR; INTRAVENOUS at 04:08

## 2023-08-20 RX ADMIN — ACETAMINOPHEN 650 MG: 325 TABLET ORAL at 08:08

## 2023-08-20 RX ADMIN — IRON SUCROSE 100 MG: 20 INJECTION, SOLUTION INTRAVENOUS at 12:08

## 2023-08-20 RX ADMIN — FERROUS SULFATE TAB 325 MG (65 MG ELEMENTAL FE) 1 EACH: 325 (65 FE) TAB at 09:08

## 2023-08-20 RX ADMIN — IBUPROFEN 400 MG: 400 TABLET ORAL at 04:08

## 2023-08-20 RX ADMIN — ACETAMINOPHEN 650 MG: 325 TABLET ORAL at 05:08

## 2023-08-20 NOTE — ASSESSMENT & PLAN NOTE
covid negative but symptomatic with fever, dyspnea, myalgias  Symptoms started on Tuesday   Remains febrile  covid precautions clinically indicated  Consider repeating covid test and discontinuing precautions if negative   Supportive measures

## 2023-08-20 NOTE — SUBJECTIVE & OBJECTIVE
Interval History: Still with no current vaginal bleeding, but expects her period to start in another 2-3 days.  No pelvic pain.      Scheduled Meds:   ferrous sulfate  1 tablet Oral Daily    QUEtiapine  50 mg Oral QHS     Continuous Infusions:  PRN Meds:0.9%  NaCl infusion (for blood administration), 0.9%  NaCl infusion (for blood administration), acetaminophen, sodium chloride 0.9%    Review of patient's allergies indicates:  No Known Allergies    Objective:     Vital Signs (Most Recent):  Temp: 100.2 °F (37.9 °C) (08/20/23 0703)  Pulse: 92 (08/20/23 0703)  Resp: 17 (08/20/23 0703)  BP: 111/66 (08/20/23 0703)  SpO2: 97 % (08/20/23 0703) Vital Signs (24h Range):  Temp:  [98 °F (36.7 °C)-102.7 °F (39.3 °C)] 100.2 °F (37.9 °C)  Pulse:  [] 92  Resp:  [16-30] 17  SpO2:  [97 %-100 %] 97 %  BP: (104-119)/(57-79) 111/66     Weight: 57.3 kg (126 lb 5.2 oz)  Body mass index is 21.02 kg/m².  No LMP recorded.    I&O (Last 24H):    Intake/Output Summary (Last 24 hours) at 8/20/2023 1047  Last data filed at 8/19/2023 2010  Gross per 24 hour   Intake 2411.25 ml   Output --   Net 2411.25 ml         Laboratory:  Recent Lab Results  (Last 5 results in the past 24 hours)        08/20/23  0947   08/20/23  0520   08/19/23  1610   08/19/23  1354   08/19/23  1318        Unit Blood Type Code       7300  [P]                7300         Unit Expiration       [id number]  [P]                [id number]         Unit Blood Type       B POS  [P]                B POS         Albumin     3.2           Alkaline Phosphatase     73           ALT     25           Anion Gap     11           Aniso     Moderate           AST     15           Baso # 0.02   0.01   0.03           Basophil % 0.5   0.3   0.6           BILIRUBIN TOTAL     1.2  Comment: For infants and newborns, interpretation of results should be based  on gestational age, weight and in agreement with clinical  observations.    Premature Infant recommended reference ranges:  Up to 24  hours.............<8.0 mg/dL  Up to 48 hours............<12.0 mg/dL  3-5 days..................<15.0 mg/dL  6-29 days.................<15.0 mg/dL             BUN     4           Calcium     8.4           Chloride     102           CO2     21           CODING SYSTEM       [id number]  [P]                [id number]         Creatinine     0.8           Crossmatch Interpretation       Not Required  [P]                Compatible         Differential Method Automated   Automated   Automated           DISPENSE STATUS       CROSSMATCHED  [P]                TRANSFUSED         eGFR     >60           Eos # 0.0   0.0   0.0           Eosinophil % 0.2   0.3   0.2           Ferritin     44           Glucose     110           Gran # (ANC) 2.8   2.6   2.8           Gran % 63.5   64.4   58.5           Group & Rh       B POS         Hematocrit 24.7   24.5   20.4           Hemoglobin 6.8   6.8   5.4  Comment: HGB AND PLT critical result(s) called and verbal readback obtained   from BERNARDA HIGHTOWER RN by GERARDO 08/19/2023 16:40             Hypo     Occasional           Immature Grans (Abs) 0.02  Comment: Mild elevation in immature granulocytes is non specific and   can be seen in a variety of conditions including stress response,   acute inflammation, trauma and pregnancy. Correlation with other   laboratory and clinical findings is essential.     0.02  Comment: Mild elevation in immature granulocytes is non specific and   can be seen in a variety of conditions including stress response,   acute inflammation, trauma and pregnancy. Correlation with other   laboratory and clinical findings is essential.     0.02  Comment: Mild elevation in immature granulocytes is non specific and   can be seen in a variety of conditions including stress response,   acute inflammation, trauma and pregnancy. Correlation with other   laboratory and clinical findings is essential.             Immature Granulocytes 0.5   0.5   0.4           INDIRECT ANDRES       NEG          Iron     18           Lactate, Narinder       1.2  Comment: Falsely low lactic acid results can be found in samples   containing >=13.0 mg/dL total bilirubin and/or >=3.5 mg/dL   direct bilirubin.           Lymph # 0.8   0.6   1.0           Lymph % 18.2   16.1   21.1           Magnesium     2.5   1.4         MCH 18.5   18.5   15.9           MCHC 27.5   27.8   26.5           MCV 67   67   60           Mono # 0.7   0.7   0.9           Mono % 17.1   18.4   19.2           MPV SEE COMMENT  Comment: Result not available.   SEE COMMENT  Comment: Result not available.   SEE COMMENT  Comment: Result not available.           nRBC 0   0   0           Ovalocytes     Occasional           Pathologist Review Review required               Platelet Estimate     Decreased           Platelets 24  Comment: PLT critical result(s) called and verbal readback obtained from   ANIRUDH ZUNIGA RN by [facility] 08/20/2023 10:04     23  Comment: PLT critical result(s) called and verbal readback obtained from   ISIDRO ORTEGA RN by [facility] 08/20/2023 06:23     22  Comment: HGB AND PLT critical result(s) called and verbal readback obtained   from BERNARDA HIGHTOWER RN by [facility] 08/19/2023 16:40             Poikilocytosis     Moderate           Poly     Occasional           Potassium     3.8           Preg Test, Ur         Negative       Product Code       [id number]  [P]                V1634H26         PROTEIN TOTAL     6.9           RBC 3.68   3.68   3.39           RDW 33.0   33.1   29.1           Saturated Iron     5           Schistocytes     Present           Sodium     134           Specimen Outdate       08/22/2023 23:59         Teardrop Cells     Occasional           TIBC     363           Transferrin     245           UNIT NUMBER       [id number]  [P]                [id number]         WBC 4.33   3.97   4.79                                   [P] - Preliminary Result               Diagnostic Results:  Labs: Reviewed  US: Reviewed     Physical Exam:    Constitutional: She is oriented to person, place, and time. She appears well-developed and well-nourished. No distress.    HENT:   Head: Normocephalic and atraumatic.     Neck: No thyromegaly present.     Pulmonary/Chest: Effort normal.        Abdominal: Soft. She exhibits no distension and no mass. There is no abdominal tenderness. There is no rebound and no guarding.             Musculoskeletal: No edema.       Neurological: She is alert and oriented to person, place, and time.    Skin: No rash noted. There is pallor (perioral pallor).    Psychiatric: She has a normal mood and affect. Her behavior is normal. Judgment and thought content normal.        Review of Systems

## 2023-08-20 NOTE — PROGRESS NOTES
[facility])  Obstetrics & Gynecology  Progress Note    Patient Name: Benny Lorenzo  MRN: [id number]  Admission Date: 2023  Primary Care Provider: No, Primary Doctor  Principal Problem: Anemia    Subjective:     HPI:  37 y/o presented to the ED with concern for possible COVID (chills, shortness of breath, and fever).  During work-up, pt noted to be anemic with Hb of 6 and thrombocytopenic with platelets 49K.  Pt negative for COVID and flu.  Upon further questioning, pt reports heavy periods.  States for the last year, she has 2 periods per month associated with heavy flow and cramping.  Prior to 1 year ago, periods were monthly, duration 7 days with heavy flow.  No current bleeding today.  Pt is being transfused 1 U PRBC and being admitted to hospital medicine service.  GYN consulted for hx of menorrhagia.  Pt had a normal pap 7 months ago at a [facility] clinic.  She is MM with her .  Had BTL with her 3rd  delivery.  No hx of GC/CT or PID.    Denies any current pelvic pain or current vaginal bleeding.      Interval History: Still with no current vaginal bleeding, but expects her period to start in another 2-3 days.  No pelvic pain.      Scheduled Meds:   ferrous sulfate  1 tablet Oral Daily    QUEtiapine  50 mg Oral QHS     Continuous Infusions:  PRN Meds:0.9%  NaCl infusion (for blood administration), 0.9%  NaCl infusion (for blood administration), acetaminophen, sodium chloride 0.9%    Review of patient's allergies indicates:  No Known Allergies    Objective:     Vital Signs (Most Recent):  Temp: 100.2 °F (37.9 °C) (23 0703)  Pulse: 92 (23 0703)  Resp: 17 (23 0703)  BP: 111/66 (23 0703)  SpO2: 97 % (23 07) Vital Signs (24h Range):  Temp:  [98 °F (36.7 °C)-102.7 °F (39.3 °C)] 100.2 °F (37.9 °C)  Pulse:  [] 92  Resp:  [16-30] 17  SpO2:  [97 %-100 %] 97 %  BP: (104-119)/(57-79) 111/66     Weight: 57.3 kg (126 lb 5.2 oz)  Body mass  index is 21.02 kg/m².  No LMP recorded.    I&O (Last 24H):    Intake/Output Summary (Last 24 hours) at 8/20/2023 1047  Last data filed at 8/19/2023 2010  Gross per 24 hour   Intake 2411.25 ml   Output --   Net 2411.25 ml         Laboratory:  Recent Lab Results  (Last 5 results in the past 24 hours)        08/20/23  0947   08/20/23  0520   08/19/23  1610   08/19/23  1354   08/19/23  1318        Unit Blood Type Code       7300  [P]                7300         Unit Expiration       [id number]  [P]                [id number]         Unit Blood Type       B POS  [P]                B POS         Albumin     3.2           Alkaline Phosphatase     73           ALT     25           Anion Gap     11           Aniso     Moderate           AST     15           Baso # 0.02   0.01   0.03           Basophil % 0.5   0.3   0.6           BILIRUBIN TOTAL     1.2  Comment: For infants and newborns, interpretation of results should be based  on gestational age, weight and in agreement with clinical  observations.    Premature Infant recommended reference ranges:  Up to 24 hours.............<8.0 mg/dL  Up to 48 hours............<12.0 mg/dL  3-5 days..................<15.0 mg/dL  6-29 days.................<15.0 mg/dL             BUN     4           Calcium     8.4           Chloride     102           CO2     21           CODING SYSTEM       [id number]  [P]                [id number]         Creatinine     0.8           Crossmatch Interpretation       Not Required  [P]                Compatible         Differential Method Automated   Automated   Automated           DISPENSE STATUS       CROSSMATCHED  [P]                TRANSFUSED         eGFR     >60           Eos # 0.0   0.0   0.0           Eosinophil % 0.2   0.3   0.2           Ferritin     44           Glucose     110           Gran # (ANC) 2.8   2.6   2.8           Gran % 63.5   64.4   58.5           Group & Rh       B POS         Hematocrit 24.7   24.5   20.4           Hemoglobin 6.8    6.8   5.4  Comment: HGB AND PLT critical result(s) called and verbal readback obtained   from BERNARDA HGIHTOWER RN by GERARDO 08/19/2023 16:40             Hypo     Occasional           Immature Grans (Abs) 0.02  Comment: Mild elevation in immature granulocytes is non specific and   can be seen in a variety of conditions including stress response,   acute inflammation, trauma and pregnancy. Correlation with other   laboratory and clinical findings is essential.     0.02  Comment: Mild elevation in immature granulocytes is non specific and   can be seen in a variety of conditions including stress response,   acute inflammation, trauma and pregnancy. Correlation with other   laboratory and clinical findings is essential.     0.02  Comment: Mild elevation in immature granulocytes is non specific and   can be seen in a variety of conditions including stress response,   acute inflammation, trauma and pregnancy. Correlation with other   laboratory and clinical findings is essential.             Immature Granulocytes 0.5   0.5   0.4           INDIRECT ANDRES       NEG         Iron     18           Lactate, Narinder       1.2  Comment: Falsely low lactic acid results can be found in samples   containing >=13.0 mg/dL total bilirubin and/or >=3.5 mg/dL   direct bilirubin.           Lymph # 0.8   0.6   1.0           Lymph % 18.2   16.1   21.1           Magnesium     2.5   1.4         MCH 18.5   18.5   15.9           MCHC 27.5   27.8   26.5           MCV 67   67   60           Mono # 0.7   0.7   0.9           Mono % 17.1   18.4   19.2           MPV SEE COMMENT  Comment: Result not available.   SEE COMMENT  Comment: Result not available.   SEE COMMENT  Comment: Result not available.           nRBC 0   0   0           Ovalocytes     Occasional           Pathologist Review Review required               Platelet Estimate     Decreased           Platelets 24  Comment: PLT critical result(s) called and verbal readback obtained from   ANIRUDH  NADIA RN by [facility] 08/20/2023 10:04     23  Comment: PLT critical result(s) called and verbal readback obtained from   ISIDRO ORTEGA RN by [facility] 08/20/2023 06:23     22  Comment: HGB AND PLT critical result(s) called and verbal readback obtained   from BERNARDA HIGHTOWER RN by [facility] 08/19/2023 16:40             Poikilocytosis     Moderate           Poly     Occasional           Potassium     3.8           Preg Test, Ur         Negative       Product Code       [id number]  [P]                [id number]         PROTEIN TOTAL     6.9           RBC 3.68   3.68   3.39           RDW 33.0   33.1   29.1           Saturated Iron     5           Schistocytes     Present           Sodium     134           Specimen Outdate       08/22/2023 23:59         Teardrop Cells     Occasional           TIBC     363           Transferrin     245           UNIT NUMBER       [id number]  [P]                [id number]         WBC 4.33   3.97   4.79                                   [P] - Preliminary Result               Diagnostic Results:  Labs: Reviewed  US: Reviewed     Physical Exam:   Constitutional: She is oriented to person, place, and time. She appears well-developed and well-nourished. No distress.    HENT:   Head: Normocephalic and atraumatic.     Neck: No thyromegaly present.     Pulmonary/Chest: Effort normal.        Abdominal: Soft. She exhibits no distension and no mass. There is no abdominal tenderness. There is no rebound and no guarding.             Musculoskeletal: No edema.       Neurological: She is alert and oriented to person, place, and time.    Skin: No rash noted. There is pallor (perioral pallor).    Psychiatric: She has a normal mood and affect. Her behavior is normal. Judgment and thought content normal.        Review of Systems      Assessment/Plan:     * Anemia  Transfuse PRBC's per primary team    Thrombocytopenia  This can certainly contribute to heavy menstrual bleeding.  Not sure if this has been a chronic problem vs  a new acute issue for the patient.    Splenomegaly noted on CT.  Recommend heme/onc consultation for further evaluation and management.  Correction of this may improve her menorrhagia      Menorrhagia with regular cycle  8/20/23:  I suspect that her significant thrombocytopenia may be exacerbating heavy periods.  Pelvic ultrasound with normal uterus  Will send Rx for provera for patient to take if heavy bleeding resumes  Can follow-up with me outpatient for further evaluation and management.    Our service will sign off while inpatient.  We will arrange outpatient follow-up and will send Rx for provera for pt to have at discharge.      Dana Bobby MD  Obstetrics & Gynecology  [place] - Telemetry ([facility])

## 2023-08-20 NOTE — ASSESSMENT & PLAN NOTE
Patient has documented iron deficiency also MCV is quite low.  This high likelihood patient has thalassemia as well but will would please intravenous iron to see where her platelet count stabilizes at but may be related to hypersplenism do not feel dexamethasone is needed at this particular point unless for inflammatory response

## 2023-08-20 NOTE — ASSESSMENT & PLAN NOTE
Thrombocytopenia may be related to hypersplenism.  With splenomegaly and fatty infiltration of liver.  Unusual to have ITP with splenomegaly.  Would recommend blue top tube for confirmation.  Patient also is severely iron deficient.  Would recommend repletion of IV iron occasionally you see thrombocytopenia with severe iron deficiency as this patient is agree with Venofer would probably escalate dose to 200 mg 1 time dose in hospital and follow CBC if no bleeding do not need to intervene at this point

## 2023-08-20 NOTE — ASSESSMENT & PLAN NOTE
This can certainly contribute to heavy menstrual bleeding.  Not sure if this has been a chronic problem vs a new acute issue for the patient.    Splenomegaly noted on CT.  Recommend heme/onc consultation for further evaluation and management.  Correction of this may improve her menorrhagia

## 2023-08-20 NOTE — ASSESSMENT & PLAN NOTE
Progress Note - Pulmonary   General Tavon 68 y.o. female MRN: [id number]  Unit/Bed#: [facility] 826-01 Encounter: [id number]    Assessment/Plan:    Acute hypoxic respiratory failure, likely due to mucous plugging  - on admission requires 2L of oxygen support. Does not require oxygen at home. Her acute episode might be secondary to bronchilitis vs asthma exacerbation  - Ct chest on 7/27 showed debris in the RML and RLL suspecting mucous plugging vs endobronchial lesion. Also with evidence of emphysema  - start with 3% IH TID, Symbicort 80-4.5mcg BID, incentive spirometry and flutter valve. CPT ordered. - negative PCT. Hold off abx for now  - Given rapid improving for her clinical condition, might hold off bronchoscope and repeat CT in 4 weeks. If there is persisted RML and RLL collapse, will then perform bronchoscope to exclude endobronchial lesion    Tobacco use disorder  Smokes 0.25 pk for at least 40 years. Recently quit. On nicotine patch    Asthma, mild persisted  Currently on Symbicort BID and albuterol PRN. On exam not wheezing. Will hold off MTP for now  Maintain SpO2 > 88%    Chief Complaint:    I am feeling better    Subjective:    Exam at bedside in the PM. Patient is awake, not in stress, on room air, and can speak full sentence without disruption. Patient states that she is almost back to her baseline and is determined to quit smoking. She still feels like coughing when she is lying on her right side. No sputum output noted. Objective:    Vitals: Blood pressure 107/60, pulse 93, temperature 99.1 °F (37.3 °C), resp. rate 16, height 5' 2" (1.575 m), weight 57.6 kg (127 lb), SpO2 95 %. on room air,Body mass index is 23.23 kg/m².       Intake/Output Summary (Last 24 hours) at 7/29/2023 1400  Last data filed at 7/29/2023 0800  Gross per 24 hour   Intake 240 ml   Output 600 ml   Net -360 ml       Invasive Devices     Peripheral Intravenous Line  Duration           Peripheral IV 07/28/23 Left Antecubital 1 day Transfuse blood  hb/hct responded   Menorrhagia   Gyn consulted and recommended provera during menstrual cycle to control bleeding  Hem consulted and recommended iron supplementation  Patient received iron  Will defer additional iron sources until repeat blood cultures and intravenous rocephin     Physical Exam:     Physical Exam  Constitutional:       General: She is not in acute distress. Appearance: Normal appearance. She is not ill-appearing. Cardiovascular:      Rate and Rhythm: Normal rate and regular rhythm. Pulses: Normal pulses. Heart sounds: No murmur heard. Pulmonary:      Effort: Pulmonary effort is normal. No respiratory distress. Breath sounds: Normal breath sounds. No stridor. No wheezing. Abdominal:      General: Abdomen is flat. Palpations: Abdomen is soft. Musculoskeletal:      Right lower leg: No edema. Left lower leg: No edema. Comments: Chronic shoulder and low back pain   Skin:     Findings: No lesion. Neurological:      Mental Status: She is alert. Labs: I have personally reviewed pertinent lab results.         Imaging and other studies: I have personally reviewed pertinent films in PACS

## 2023-08-20 NOTE — ASSESSMENT & PLAN NOTE
Patient has hyponatremia which is controlled,We will aim to correct the sodium by 4-6mEq in 24 hours. We will monitor sodium Every 12 hours. The hyponatremia is due to Dehydration/hypovolemia. We will obtain the following studies: TSH, T4. We will treat the hyponatremia with IV fluids as follows: normal saline. The patient's sodium results have been reviewed and are listed below.  Recent Labs   Lab 08/19/23  1610   *     Improving

## 2023-08-20 NOTE — SUBJECTIVE & OBJECTIVE
Oncology Treatment Plan:   [No matching plan found]    Medications:  Continuous Infusions:  Scheduled Meds:   ferrous sulfate  1 tablet Oral Daily    iron sucrose  100 mg Intravenous Q7 Days    QUEtiapine  50 mg Oral QHS     PRN Meds:0.9%  NaCl infusion (for blood administration), 0.9%  NaCl infusion (for blood administration), acetaminophen, sodium chloride 0.9%     Review of patient's allergies indicates:  No Known Allergies     Past Medical History:   Diagnosis Date    Anemia, unspecified      Past Surgical History:   Procedure Laterality Date     SECTION      x3    TONSILLECTOMY      TUBAL LIGATION Bilateral      Family History    None       Tobacco Use    Smoking status: Never    Smokeless tobacco: Not on file   Substance and Sexual Activity    Alcohol use: Never    Drug use: Not on file    Sexual activity: Yes     Partners: Male     Birth control/protection: See Surgical Hx       Review of Systems   Unable to perform ROS: Other (COVID-19 precautions)     Objective:     Vital Signs (Most Recent):  Temp: 99 °F (37.2 °C) (23 1123)  Pulse: 92 (23 1123)  Resp: 18 (23 1123)  BP: 111/68 (23 1123)  SpO2: (!) 94 % (23 1123) Vital Signs (24h Range):  Temp:  [98 °F (36.7 °C)-102.7 °F (39.3 °C)] 99 °F (37.2 °C)  Pulse:  [] 92  Resp:  [16-27] 18  SpO2:  [94 %-100 %] 94 %  BP: (104-119)/(57-79) 111/68     Weight: 57.3 kg (126 lb 5.2 oz)  Body mass index is 21.02 kg/m².  Body surface area is 1.62 meters squared.      Intake/Output Summary (Last 24 hours) at 2023 1305  Last data filed at 2023  Gross per 24 hour   Intake 2411.25 ml   Output --   Net 2411.25 ml        Physical Exam  Vitals reviewed.          Significant Labs:   BMP:   Recent Labs   Lab 23  1201 23  1354 23  1610   *  --  110   *  --  134*   K 3.0*  --  3.8     --  102   CO2 19*  --  21*   BUN 4*  --  4*   CREATININE 0.9  --  0.8   CALCIUM 8.9  --  8.4*   MG  --  1.4*  "2.5   , CBC:   Recent Labs   Lab 08/19/23  1610 08/20/23  0520 08/20/23  0947   WBC 4.79 3.97 4.33   HGB 5.4* 6.8* 6.8*   HCT 20.4* 24.5* 24.7*   PLT 22* 23* 24*   , CMP:   Recent Labs   Lab 08/19/23  1201 08/19/23  1610   * 134*   K 3.0* 3.8    102   CO2 19* 21*   * 110   BUN 4* 4*   CREATININE 0.9 0.8   CALCIUM 8.9 8.4*   PROT 8.4 6.9   ALBUMIN 3.9 3.2*   BILITOT 1.3* 1.2*   ALKPHOS 88 73   AST 20 15   ALT 33 25   ANIONGAP 12 11   , Coagulation: No results for input(s): "PT", "INR", "APTT" in the last 48 hours., Haptoglobin: No results for input(s): "HAPTOGLOBIN" in the last 48 hours., Immunology: No results for input(s): "SPEP", "ELVA", "DONELL", "FREELAMBDALI" in the last 48 hours., LDH: No results for input(s): "LDHCSF", "BFSOURCE" in the last 48 hours., LFTs:   Recent Labs   Lab 08/19/23  1201 08/19/23  1610   ALT 33 25   AST 20 15   ALKPHOS 88 73   BILITOT 1.3* 1.2*   PROT 8.4 6.9   ALBUMIN 3.9 3.2*   , Reticulocytes: No results for input(s): "RETIC" in the last 48 hours., Tumor Markers: No results for input(s): "PSA", "CEA", "", "AFPTM", "[id number]", "" in the last 48 hours.    Invalid input(s): "ALGTM", Uric Acid No results for input(s): "URICACID" in the last 48 hours., and Urine Studies:   Recent Labs   Lab 08/19/23  1126   COLORU Yellow   APPEARANCEUA Clear   PHUR 6.0   SPECGRAV 1.015   PROTEINUA 2+*   GLUCUA Negative   KETONESU Negative   BILIRUBINUA Negative   OCCULTUA 1+*   NITRITE Negative   UROBILINOGEN 4.0-6.0*   LEUKOCYTESUR Trace*   RBCUA 3   WBCUA 10*   BACTERIA Rare   HYALINECASTS 0       Diagnostic Results:  I have reviewed all pertinent imaging results/findings within the past 24 hours.  "

## 2023-08-20 NOTE — ASSESSMENT & PLAN NOTE
This patient does have evidence of infective focus  My overall impression is sepsis.  Source: Unknown  Antibiotics given-   Antibiotics (72h ago, onward)    Start     Stop Route Frequency Ordered    08/20/23 1530  cefTRIAXone (ROCEPHIN) 2 g in dextrose 5 % in water (D5W) 100 mL IVPB (MB+)         -- IV Every 24 hours (non-standard times) 08/20/23 1418        Latest lactate reviewed-  Recent Labs   Lab 08/19/23  1354   LACTATE 1.2     Organ dysfunction indicated by Thrombocytopenia     Fluid challenge Not needed - patient is not hypotensive      Post- resuscitation assessment Yes Perfusion exam was performed within 6 hours of septic shock presentation after bolus shows Adequate tissue perfusion assessed by non-invasive monitoring       Will Not start Pressors- Levophed for MAP of 65  Source control achieved by: intravenous rocephin  Based on tachycardia, tachypnea, fever, thrombocytopenia  Sofa 3  Received rocephin in emergency department  Source control  Continue intravenous rocephin  Will need repeat blood cultures

## 2023-08-20 NOTE — PLAN OF CARE
Pt VSS, AOX4, 1 unit of Platelets given, abxs given, on reg diet, tolerated this shift, complained of lower back pain, ibuprofen given, on cardiac monitor ST. No acute issue this shift.  Problem: Adult Inpatient Plan of Care  Goal: Plan of Care Review  Outcome: Ongoing, Progressing  Goal: Patient-Specific Goal (Individualized)  Outcome: Ongoing, Progressing  Goal: Absence of Hospital-Acquired Illness or Injury  Outcome: Ongoing, Progressing  Goal: Optimal Comfort and Wellbeing  Outcome: Ongoing, Progressing  Goal: Readiness for Transition of Care  Outcome: Ongoing, Progressing     Problem: Fatigue  Goal: Improved Activity Tolerance  Outcome: Ongoing, Progressing     Problem: Infection  Goal: Absence of Infection Signs and Symptoms  Outcome: Ongoing, Progressing     Problem: Adjustment to Illness (Sepsis/Septic Shock)  Goal: Optimal Coping  Outcome: Ongoing, Progressing     Problem: Bleeding (Sepsis/Septic Shock)  Goal: Absence of Bleeding  Outcome: Ongoing, Progressing     Problem: Glycemic Control Impaired (Sepsis/Septic Shock)  Goal: Blood Glucose Level Within Desired Range  Outcome: Ongoing, Progressing     Problem: Infection Progression (Sepsis/Septic Shock)  Goal: Absence of Infection Signs and Symptoms  Outcome: Ongoing, Progressing     Problem: Nutrition Impaired (Sepsis/Septic Shock)  Goal: Optimal Nutrition Intake  Outcome: Ongoing, Progressing

## 2023-08-20 NOTE — CONSULTS
[facility] Tele[facility] ([facility])  Hematology/Oncology  Consult Note    Patient Name: Benny Lorenzo  MRN: [id number]  Admission Date: 2023  Hospital Length of Stay: 0 days  Code Status: Full Code   Attending Provider: Charleen Blackman MD  Consulting Provider: Eder Centeno MD  Primary Care Physician: No, Primary Doctor  Principal Problem:Anemia    Consults  Subjective:     HPI:  38-year-old female found to be pants cytopenic with low hemoglobin as well as platelet count.  Rule out COVID-19 COVID-19 patient is found to be anemic as well as thrombocytopenic we were asked to see the patient for further evaluation unable to see patient physically because of COVID-19 precautions      Oncology Treatment Plan:   [No matching plan found]    Medications:  Continuous Infusions:  Scheduled Meds:   ferrous sulfate  1 tablet Oral Daily    iron sucrose  100 mg Intravenous Q7 Days    QUEtiapine  50 mg Oral QHS     PRN Meds:0.9%  NaCl infusion (for blood administration), 0.9%  NaCl infusion (for blood administration), acetaminophen, sodium chloride 0.9%     Review of patient's allergies indicates:  No Known Allergies     Past Medical History:   Diagnosis Date    Anemia, unspecified      Past Surgical History:   Procedure Laterality Date     SECTION      x3    TONSILLECTOMY      TUBAL LIGATION Bilateral      Family History    None       Tobacco Use    Smoking status: Never    Smokeless tobacco: Not on file   Substance and Sexual Activity    Alcohol use: Never    Drug use: Not on file    Sexual activity: Yes     Partners: Male     Birth control/protection: See Surgical Hx       Review of Systems   Unable to perform ROS: Other (COVID-19 precautions)     Objective:     Vital Signs (Most Recent):  Temp: 99 °F (37.2 °C) (23 1123)  Pulse: 92 (23 1123)  Resp: 18 (23 1123)  BP: 111/68 (23 1123)  SpO2: (!) 94 % (23 1123) Vital Signs (24h Range):  Temp:  [98 °F (36.7 °C)-102.7 °F (39.3 °C)]  "99 °F (37.2 °C)  Pulse:  [] 92  Resp:  [16-27] 18  SpO2:  [94 %-100 %] 94 %  BP: (104-119)/(57-79) 111/68     Weight: 57.3 kg (126 lb 5.2 oz)  Body mass index is 21.02 kg/m².  Body surface area is 1.62 meters squared.      Intake/Output Summary (Last 24 hours) at 8/20/2023 1305  Last data filed at 8/19/2023 2010  Gross per 24 hour   Intake 2411.25 ml   Output --   Net 2411.25 ml        Physical Exam  Vitals reviewed.          Significant Labs:   BMP:   Recent Labs   Lab 08/19/23  1201 08/19/23  1354 08/19/23  1610   *  --  110   *  --  134*   K 3.0*  --  3.8     --  102   CO2 19*  --  21*   BUN 4*  --  4*   CREATININE 0.9  --  0.8   CALCIUM 8.9  --  8.4*   MG  --  1.4* 2.5   , CBC:   Recent Labs   Lab 08/19/23  1610 08/20/23  0520 08/20/23  0947   WBC 4.79 3.97 4.33   HGB 5.4* 6.8* 6.8*   HCT 20.4* 24.5* 24.7*   PLT 22* 23* 24*   , CMP:   Recent Labs   Lab 08/19/23  1201 08/19/23  1610   * 134*   K 3.0* 3.8    102   CO2 19* 21*   * 110   BUN 4* 4*   CREATININE 0.9 0.8   CALCIUM 8.9 8.4*   PROT 8.4 6.9   ALBUMIN 3.9 3.2*   BILITOT 1.3* 1.2*   ALKPHOS 88 73   AST 20 15   ALT 33 25   ANIONGAP 12 11   , Coagulation: No results for input(s): "PT", "INR", "APTT" in the last 48 hours., Haptoglobin: No results for input(s): "HAPTOGLOBIN" in the last 48 hours., Immunology: No results for input(s): "SPEP", "ELVA", "DONELL", "FREELAMBDALI" in the last 48 hours., LDH: No results for input(s): "LDHCSF", "BFSOURCE" in the last 48 hours., LFTs:   Recent Labs   Lab 08/19/23  1201 08/19/23  1610   ALT 33 25   AST 20 15   ALKPHOS 88 73   BILITOT 1.3* 1.2*   PROT 8.4 6.9   ALBUMIN 3.9 3.2*   , Reticulocytes: No results for input(s): "RETIC" in the last 48 hours., Tumor Markers: No results for input(s): "PSA", "CEA", "", "AFPTM", "[id number]", "" in the last 48 hours.    Invalid input(s): "ALGTM", Uric Acid No results for input(s): "URICACID" in the last 48 hours., and Urine Studies: "   Recent Labs   Lab 08/19/23  1126   COLORU Yellow   APPEARANCEUA Clear   PHUR 6.0   SPECGRAV 1.015   PROTEINUA 2+*   GLUCUA Negative   KETONESU Negative   BILIRUBINUA Negative   OCCULTUA 1+*   NITRITE Negative   UROBILINOGEN 4.0-6.0*   LEUKOCYTESUR Trace*   RBCUA 3   WBCUA 10*   BACTERIA Rare   HYALINECASTS 0       Diagnostic Results:  I have reviewed all pertinent imaging results/findings within the past 24 hours.    Assessment/Plan:     * Anemia  Patient has documented iron deficiency also MCV is quite low.  This high likelihood patient has thalassemia as well but will would please intravenous iron to see where her platelet count stabilizes at but may be related to hypersplenism do not feel dexamethasone is needed at this particular point unless for inflammatory response    Thrombocytopenia  Thrombocytopenia may be related to hypersplenism.  With splenomegaly and fatty infiltration of liver.  Unusual to have ITP with splenomegaly.  Would recommend blue top tube for confirmation.  Patient also is severely iron deficient.  Would recommend repletion of IV iron occasionally you see thrombocytopenia with severe iron deficiency as this patient is agree with Venofer would probably escalate dose to 200 mg 1 time dose in hospital and follow CBC if no bleeding do not need to intervene at this point    SOB (shortness of breath)  Patient is anemic agree with intravenous iron at present time    SIRS (systemic inflammatory response syndrome)  Cared for by primary care team        Thank you for your consult. I will follow-up with patient. Please contact us if you have any additional questions.    Eder Centeno MD  Hematology/Oncology  [facility] ([facility])

## 2023-08-20 NOTE — ASSESSMENT & PLAN NOTE
Transfused platelets  Splenomegaly on imaging  Hem/onc consulted  Platelet blue top 45k  Follow up outpatient for possible blood disorder workup if no improvement

## 2023-08-20 NOTE — HOSPITAL COURSE
The patient was admitted on 8/20 following fever, potential exposure to COVID-19, and hypokalemia. Upon evaluation, patient was found to have a profound thrombocytopenia, anemia, E.coli bacteremia. Intravenous Rocephin was initiated. The patient's iron supplementation was halted and they were transfused with one unit each of PRBCs and platelets. Vaginal bleeding subsided early on in hospitalization. Consultations with Hematology and Gynecology were completed. Provera sent to OP pharmacy and patient instructed to follow up OP.     By 8/21, Cultures were taken to monitor the resolution of the infection. On 8/22, the patient remained stable and asymptomatic, with initial cultures confirming the sensitivity of the bacteremia to 3rd generation cephalosporins, thus continuing the Rocephin treatment. On 8/23, repeated cultures were negative x 48 hours and the patient, being asymptomatic, was prepared for discharge.    Regarding the patient's anemia, they were noted to have iron deficiency with a significantly reduced MCV. There's a high possibility of concurrent thalassemia. While IV iron is recommended for the iron deficiency, the potential impact of hypersplenism on the platelet count is also being considered. Dexamethasone isn't deemed necessary at this juncture.    Hematology highlighted the patient's thrombocytopenia, which might be linked to hypersplenism, given the evidence of splenomegaly and fatty liver infiltration. Immune thrombocytopenic purpura (ITP) was deemed unlikely due to the splenomegaly. A blue top tube was recommended for further confirmation, alongside IV iron repletion. On 08/21, the blue top tube revealed a platelet count of 45,000, and it was advised to continually monitor the CBC. As of 08/23, the patient exhibited a significant rise in platelet count after receiving iron. Monitoring is ongoing, with recommendations for further IV iron treatment, specifically Venofer at 200 mg weekly in outpatient  "clinic. Follow-up through the hematology clinic was organized prior to discharge. Antibiotics changed to PO Omnicef and patient given 10-day prescription to complete 14 day total therapy. Patient acknowledged understanding and agreed to the plan.     /72 (BP Location: Right arm, Patient Position: Sitting)   Pulse 76   Temp 98.4 °F (36.9 °C) (Oral)   Resp 18   Ht 5' 5" (1.651 m)   Wt 57.3 kg (126 lb 5.2 oz)   LMP 08/22/2023 (Exact Date)   SpO2 100%   Breastfeeding No   BMI 21.02 kg/m² '  PHYSICAL EXAM  Vitals Reviewed  GEN: No acute distress, pleasant, body habitus normal  HEENT: atraumatic and normocephalic  CARDS: regular rate and rhythm, no m/g, pulses palpable in LE  PULM: breathing comfortably on room air, chest symmetric, nonlabored, no abnormal breath sounds on auscultation  ABD: nontender, nondistended, soft, no organomegaly, BS+  Neuro: Alert and oriented x3, CN's I-IX grossly intact, sensation and motor intact; follows directions and answers questions appropriately    "

## 2023-08-20 NOTE — ASSESSMENT & PLAN NOTE
8/20/23:  I suspect that her significant thrombocytopenia may be exacerbating heavy periods.  Pelvic ultrasound with normal uterus  Will send Rx for provera for patient to take if heavy bleeding resumes  Can follow-up with me outpatient for further evaluation and management.

## 2023-08-20 NOTE — SUBJECTIVE & OBJECTIVE
Interval History: See hospital course for today      Review of Systems   Constitutional:  Positive for activity change, appetite change, fatigue and fever.   Respiratory:  Positive for cough, chest tightness and shortness of breath.    Cardiovascular:  Negative for chest pain and leg swelling.   Gastrointestinal:  Positive for abdominal pain and constipation. Negative for anal bleeding, blood in stool, nausea and vomiting.        Suffers from ibs-c   Genitourinary:  Positive for menstrual problem. Negative for dysuria and vaginal bleeding.   Musculoskeletal:  Positive for back pain.   Neurological:  Positive for weakness.   Psychiatric/Behavioral:  Negative for agitation, behavioral problems, confusion and decreased concentration. The patient is nervous/anxious.      Objective:     Vital Signs (Most Recent):  Temp: 98.7 °F (37.1 °C) (08/20/23 1400)  Pulse: 94 (08/20/23 1400)  Resp: 18 (08/20/23 1400)  BP: 112/62 (08/20/23 1400)  SpO2: (!) 94 % (08/20/23 1400) Vital Signs (24h Range):  Temp:  [98 °F (36.7 °C)-102.7 °F (39.3 °C)] 98.7 °F (37.1 °C)  Pulse:  [] 94  Resp:  [16-27] 18  SpO2:  [94 %-100 %] 94 %  BP: (104-119)/(57-79) 112/62     Weight: 57.3 kg (126 lb 5.2 oz)  Body mass index is 21.02 kg/m².    Intake/Output Summary (Last 24 hours) at 8/20/2023 1444  Last data filed at 8/19/2023 2010  Gross per 24 hour   Intake 2311.25 ml   Output --   Net 2311.25 ml         Physical Exam  Vitals and nursing note reviewed.   Constitutional:       General: She is not in acute distress.     Appearance: She is ill-appearing. She is not toxic-appearing.   HENT:      Head: Normocephalic and atraumatic.   Cardiovascular:      Rate and Rhythm: Normal rate.   Pulmonary:      Effort: Pulmonary effort is normal. No respiratory distress.   Abdominal:      Palpations: Abdomen is soft.      Tenderness: There is abdominal tenderness. There is left CVA tenderness. There is no right CVA tenderness.   Musculoskeletal:      Right  "lower leg: No edema.      Left lower leg: No edema.   Skin:     General: Skin is warm.   Neurological:      Mental Status: She is alert and oriented to person, place, and time.      Motor: Weakness present.   Psychiatric:         Mood and Affect: Mood is anxious.         Speech: Speech normal.         Behavior: Behavior normal. Behavior is cooperative.         Cognition and Memory: Cognition and memory normal.             Significant Labs: All pertinent labs within the past 24 hours have been reviewed.  Blood Culture:   Recent Labs   Lab 08/19/23  1201 08/19/23  1217   LABBLOO Gram stain aer bottle: Gram negative rods  Results called to and read back by:  JODIE HARDY 08/20/2023  10:39 No Growth to date     CBC:   Recent Labs   Lab 08/19/23  1610 08/20/23  0520 08/20/23  0947   WBC 4.79 3.97 4.33   HGB 5.4* 6.8* 6.8*   HCT 20.4* 24.5* 24.7*   PLT 22* 23* 24*     CMP:   Recent Labs   Lab 08/19/23  1201 08/19/23  1610   * 134*   K 3.0* 3.8    102   CO2 19* 21*   * 110   BUN 4* 4*   CREATININE 0.9 0.8   CALCIUM 8.9 8.4*   PROT 8.4 6.9   ALBUMIN 3.9 3.2*   BILITOT 1.3* 1.2*   ALKPHOS 88 73   AST 20 15   ALT 33 25   ANIONGAP 12 11     Lactic Acid:   Recent Labs   Lab 08/19/23  1201 08/19/23  1354   LACTATE 2.0 1.2     Magnesium:   Recent Labs   Lab 08/19/23  1354 08/19/23  1610   MG 1.4* 2.5     TSH: No results for input(s): "TSH" in the last 4320 hours.  Urine Studies:   Recent Labs   Lab 08/19/23  1126   COLORU Yellow   APPEARANCEUA Clear   PHUR 6.0   SPECGRAV 1.015   PROTEINUA 2+*   GLUCUA Negative   KETONESU Negative   BILIRUBINUA Negative   OCCULTUA 1+*   NITRITE Negative   UROBILINOGEN 4.0-6.0*   LEUKOCYTESUR Trace*   RBCUA 3   WBCUA 10*   BACTERIA Rare   HYALINECASTS 0       Significant Imaging: I have reviewed all pertinent imaging results/findings within the past 24 hours.  CT: I have reviewed all pertinent results/findings within the past 24 hours and my personal findings are:  splenomegaly, " no bowel obstruction

## 2023-08-20 NOTE — PLAN OF CARE
Problem: Adult Inpatient Plan of Care  Goal: Plan of Care Review  8/20/2023 0415 by Yobany Briggs RN  Outcome: Ongoing, Progressing  8/20/2023 0414 by Yobany Briggs RN  Outcome: Ongoing, Progressing  Flowsheets (Taken 8/20/2023 0414)  Plan of Care Reviewed With: patient     Problem: Fatigue  Goal: Improved Activity Tolerance  Outcome: Ongoing, Progressing     Problem: Infection  Goal: Absence of Infection Signs and Symptoms  Outcome: Ongoing, Progressing     POC reviewed. verbalized understanding. No complaints. Remains NSR-ST on tele. NPO. Purposeful rounding Q2hrs. all safety measures maintained.

## 2023-08-20 NOTE — PROGRESS NOTES
[facility]  Progress Note    Patient Name: Benny Lorenzo  MRN: [id number]  Patient Class: OP- Observation   Admission Date: 8/19/2023  Length of Stay: 0 days  Attending Physician: Charleen Blackman MD  Primary Care Provider: Debra, Primary Doctor        Subjective:     Principal Problem:Anemia        HPI:  38F  has a past medical history of Anemia, unspecified, anxiety and depression. Presents to emergency department with fever, diaphoresis, myalgias, trouble breathing. Positive covid exposure on Tuesday. Associated with abdominal pain, sore throat, ear ache. Denies psh gastric bypass. Last bowel movement this am. Has been having 2 menstrual cycles per month. Does not take iron supplements. No longer taking anxiety and depression medication(s).    Initial workup in emergency department revealed fever, tachycardia, and tachypnea. Cbc with significant anemia hb/hct 6.2/23.5 and thrombocytopenia of 49k. Cmp with hyponatremia, hypokalemia, nonanion gap metabolic acidosis, hypomagnesemia. Lactate within normal limits. Influenza and covid negative. Urinalysis with trace leuks and occult blood, 1+. Preg test negative. Chest x-ray unremarkable. Ct abdomen pelvis without contrast with concerns for nonobsructing intussusception, splenomegaly. Us pelvis pending.    Hospital medicine consulted for admission under observation.  Symptomatic anemia, sirs, intussusception       Overview/Hospital Course:  8/20 admitted for fever, covid exposure, hypokalemia. Remains febrile. Ecoli bacteremia. Initiated on intravenous rocephin. Discontinue iron. Transfused one unit each of prbcs and platelets. Hem and gyn consulted. Recommendations reviewed.      Interval History: See hospital course for today      Review of Systems   Constitutional:  Positive for activity change, appetite change, fatigue and fever.   Respiratory:  Positive for cough, chest tightness and shortness of breath.    Cardiovascular:   Negative for chest pain and leg swelling.   Gastrointestinal:  Positive for abdominal pain and constipation. Negative for anal bleeding, blood in stool, nausea and vomiting.        Suffers from ibs-c   Genitourinary:  Positive for menstrual problem. Negative for dysuria and vaginal bleeding.   Musculoskeletal:  Positive for back pain.   Neurological:  Positive for weakness.   Psychiatric/Behavioral:  Negative for agitation, behavioral problems, confusion and decreased concentration. The patient is nervous/anxious.      Objective:     Vital Signs (Most Recent):  Temp: 98.7 °F (37.1 °C) (08/20/23 1400)  Pulse: 94 (08/20/23 1400)  Resp: 18 (08/20/23 1400)  BP: 112/62 (08/20/23 1400)  SpO2: (!) 94 % (08/20/23 1400) Vital Signs (24h Range):  Temp:  [98 °F (36.7 °C)-102.7 °F (39.3 °C)] 98.7 °F (37.1 °C)  Pulse:  [] 94  Resp:  [16-27] 18  SpO2:  [94 %-100 %] 94 %  BP: (104-119)/(57-79) 112/62     Weight: 57.3 kg (126 lb 5.2 oz)  Body mass index is 21.02 kg/m².    Intake/Output Summary (Last 24 hours) at 8/20/2023 1444  Last data filed at 8/19/2023 2010  Gross per 24 hour   Intake 2311.25 ml   Output --   Net 2311.25 ml         Physical Exam  Vitals and nursing note reviewed.   Constitutional:       General: She is not in acute distress.     Appearance: She is ill-appearing. She is not toxic-appearing.   HENT:      Head: Normocephalic and atraumatic.   Cardiovascular:      Rate and Rhythm: Normal rate.   Pulmonary:      Effort: Pulmonary effort is normal. No respiratory distress.   Abdominal:      Palpations: Abdomen is soft.      Tenderness: There is abdominal tenderness. There is left CVA tenderness. There is no right CVA tenderness.   Musculoskeletal:      Right lower leg: No edema.      Left lower leg: No edema.   Skin:     General: Skin is warm.   Neurological:      Mental Status: She is alert and oriented to person, place, and time.      Motor: Weakness present.   Psychiatric:         Mood and Affect: Mood is  "anxious.         Speech: Speech normal.         Behavior: Behavior normal. Behavior is cooperative.         Cognition and Memory: Cognition and memory normal.             Significant Labs: All pertinent labs within the past 24 hours have been reviewed.  Blood Culture:   Recent Labs   Lab 08/19/23  1201 08/19/23  1217   LABBLOO Gram stain aer bottle: Gram negative rods  Results called to and read back by:  JODIE HARDY 08/20/2023  10:39 No Growth to date     CBC:   Recent Labs   Lab 08/19/23  1610 08/20/23  0520 08/20/23  0947   WBC 4.79 3.97 4.33   HGB 5.4* 6.8* 6.8*   HCT 20.4* 24.5* 24.7*   PLT 22* 23* 24*     CMP:   Recent Labs   Lab 08/19/23  1201 08/19/23  1610   * 134*   K 3.0* 3.8    102   CO2 19* 21*   * 110   BUN 4* 4*   CREATININE 0.9 0.8   CALCIUM 8.9 8.4*   PROT 8.4 6.9   ALBUMIN 3.9 3.2*   BILITOT 1.3* 1.2*   ALKPHOS 88 73   AST 20 15   ALT 33 25   ANIONGAP 12 11     Lactic Acid:   Recent Labs   Lab 08/19/23  1201 08/19/23  1354   LACTATE 2.0 1.2     Magnesium:   Recent Labs   Lab 08/19/23  1354 08/19/23  1610   MG 1.4* 2.5     TSH: No results for input(s): "TSH" in the last 4320 hours.  Urine Studies:   Recent Labs   Lab 08/19/23  1126   COLORU Yellow   APPEARANCEUA Clear   PHUR 6.0   SPECGRAV 1.015   PROTEINUA 2+*   GLUCUA Negative   KETONESU Negative   BILIRUBINUA Negative   OCCULTUA 1+*   NITRITE Negative   UROBILINOGEN 4.0-6.0*   LEUKOCYTESUR Trace*   RBCUA 3   WBCUA 10*   BACTERIA Rare   HYALINECASTS 0       Significant Imaging: I have reviewed all pertinent imaging results/findings within the past 24 hours.  CT: I have reviewed all pertinent results/findings within the past 24 hours and my personal findings are:  splenomegaly, no bowel obstruction      Assessment/Plan:      * Anemia  Transfuse blood  hb/hct responded   Menorrhagia   Gyn consulted and recommended provera during menstrual cycle to control bleeding  Hem consulted and recommended iron supplementation  Patient " received iron  Will defer additional iron sources until repeat blood cultures and intravenous rocephin      E coli bacteremia  Source control  Continue intravenous rocephin  Repeat blood cultures needed      Thrombocytopenia  Transfused platelets  Splenomegaly on imaging  Hem/onc consulted  Platelet blue top 45k  Follow up outpatient for possible blood disorder workup if no improvement     Menorrhagia with regular cycle  Gyn consulted  Provera recommended and prescribed       SOB (shortness of breath)  Breathing treatment prn  Chest x-ray prn       Hypokalemia  Mg and k repleted        Hyponatremia  Patient has hyponatremia which is controlled,We will aim to correct the sodium by 4-6mEq in 24 hours. We will monitor sodium Every 12 hours. The hyponatremia is due to Dehydration/hypovolemia. We will obtain the following studies: TSH, T4. We will treat the hyponatremia with IV fluids as follows: normal saline. The patient's sodium results have been reviewed and are listed below.  Recent Labs   Lab 08/19/23  1610   *     Improving     Exposure to COVID-19 virus  covid negative but symptomatic with fever, dyspnea, myalgias  Symptoms started on Tuesday   Remains febrile  covid precautions clinically indicated  Consider repeating covid test and discontinuing precautions if negative   Supportive measures    Sepsis  This patient does have evidence of infective focus  My overall impression is sepsis.  Source: Unknown  Antibiotics given-   Antibiotics (72h ago, onward)    Start     Stop Route Frequency Ordered    08/20/23 1530  cefTRIAXone (ROCEPHIN) 2 g in dextrose 5 % in water (D5W) 100 mL IVPB (MB+)         -- IV Every 24 hours (non-standard times) 08/20/23 1418        Latest lactate reviewed-  Recent Labs   Lab 08/19/23  1354   LACTATE 1.2     Organ dysfunction indicated by Thrombocytopenia     Fluid challenge Not needed - patient is not hypotensive      Post- resuscitation assessment Yes Perfusion exam was performed  within 6 hours of septic shock presentation after bolus shows Adequate tissue perfusion assessed by non-invasive monitoring       Will Not start Pressors- Levophed for MAP of 65  Source control achieved by: intravenous rocephin  Based on tachycardia, tachypnea, fever, thrombocytopenia  Sofa 3  Received rocephin in emergency department  Source control  Continue intravenous rocephin  Will need repeat blood cultures      VTE Risk Mitigation (From admission, onward)    None          Discharge Planning   ROLAND:      Code Status: Full Code   Is the patient medically ready for discharge?:     Reason for patient still in hospital (select all that apply): Patient new problem, Patient trending condition, Laboratory test, Treatment and Consult recommendations                     Charleen Blackman MD  Department of Hospital Medicine   [facility] - Telemetry ([facility])

## 2023-08-21 ENCOUNTER — TELEPHONE (OUTPATIENT)
Dept: OBSTETRICS AND GYNECOLOGY | Facility: CLINIC | Age: 38
End: 2023-08-21

## 2023-08-21 LAB
ACANTHOCYTES BLD QL SMEAR: PRESENT
ANISOCYTOSIS BLD QL SMEAR: SLIGHT
BASOPHILS # BLD AUTO: 0.04 K/UL (ref 0–0.2)
BASOPHILS NFR BLD: 1.2 % (ref 0–1.9)
DACRYOCYTES BLD QL SMEAR: ABNORMAL
DIFFERENTIAL METHOD: ABNORMAL
EOSINOPHIL # BLD AUTO: 0.1 K/UL (ref 0–0.5)
EOSINOPHIL NFR BLD: 2.4 % (ref 0–8)
ERYTHROCYTE [DISTWIDTH] IN BLOOD BY AUTOMATED COUNT: 33.6 % (ref 11.5–14.5)
HCT VFR BLD AUTO: 27.9 % (ref 37–48.5)
HGB BLD-MCNC: 7.3 G/DL (ref 12–16)
IMM GRANULOCYTES # BLD AUTO: 0.04 K/UL (ref 0–0.04)
IMM GRANULOCYTES NFR BLD AUTO: 1.2 % (ref 0–0.5)
LYMPHOCYTES # BLD AUTO: 0.9 K/UL (ref 1–4.8)
LYMPHOCYTES NFR BLD: 26.4 % (ref 18–48)
MCH RBC QN AUTO: 17.9 PG (ref 27–31)
MCHC RBC AUTO-ENTMCNC: 26.2 G/DL (ref 32–36)
MCV RBC AUTO: 69 FL (ref 82–98)
MONOCYTES # BLD AUTO: 0.4 K/UL (ref 0.3–1)
MONOCYTES NFR BLD: 11.2 % (ref 4–15)
NEUTROPHILS # BLD AUTO: 1.9 K/UL (ref 1.8–7.7)
NEUTROPHILS NFR BLD: 57.6 % (ref 38–73)
NRBC BLD-RTO: 0 /100 WBC
OVALOCYTES BLD QL SMEAR: ABNORMAL
PATH REV BLD -IMP: NORMAL
PLATELET # BLD AUTO: 96 K/UL (ref 150–450)
PLATELET BLD QL SMEAR: ABNORMAL
PMV BLD AUTO: ABNORMAL FL (ref 9.2–12.9)
POIKILOCYTOSIS BLD QL SMEAR: SLIGHT
RBC # BLD AUTO: 4.07 M/UL (ref 4–5.4)
SCHISTOCYTES BLD QL SMEAR: PRESENT
WBC # BLD AUTO: 3.3 K/UL (ref 3.9–12.7)

## 2023-08-21 PROCEDURE — 21400001 HC TELEMETRY ROOM

## 2023-08-21 PROCEDURE — 99231 PR SUBSEQUENT HOSPITAL CARE,LEVL I: ICD-10-PCS | Mod: ,,, | Performed by: INTERNAL MEDICINE

## 2023-08-21 PROCEDURE — 85025 COMPLETE CBC W/AUTO DIFF WBC: CPT | Performed by: STUDENT IN AN ORGANIZED HEALTH CARE EDUCATION/TRAINING PROGRAM

## 2023-08-21 PROCEDURE — 63600175 PHARM REV CODE 636 W HCPCS: Performed by: FAMILY MEDICINE

## 2023-08-21 PROCEDURE — 99231 SBSQ HOSP IP/OBS SF/LOW 25: CPT | Mod: ,,, | Performed by: INTERNAL MEDICINE

## 2023-08-21 PROCEDURE — 87040 BLOOD CULTURE FOR BACTERIA: CPT | Performed by: STUDENT IN AN ORGANIZED HEALTH CARE EDUCATION/TRAINING PROGRAM

## 2023-08-21 PROCEDURE — 25000003 PHARM REV CODE 250: Performed by: FAMILY MEDICINE

## 2023-08-21 PROCEDURE — 36415 COLL VENOUS BLD VENIPUNCTURE: CPT | Performed by: STUDENT IN AN ORGANIZED HEALTH CARE EDUCATION/TRAINING PROGRAM

## 2023-08-21 RX ADMIN — CEFTRIAXONE 2 G: 2 INJECTION, POWDER, FOR SOLUTION INTRAMUSCULAR; INTRAVENOUS at 02:08

## 2023-08-21 NOTE — PLAN OF CARE
Problem: Adult Inpatient Plan of Care  Goal: Plan of Care Review  Outcome: Ongoing, Progressing  Flowsheets (Taken 8/21/2023 0605)  Plan of Care Reviewed With: patient     Problem: Fatigue  Goal: Improved Activity Tolerance  Outcome: Ongoing, Progressing     Problem: Infection  Goal: Absence of Infection Signs and Symptoms  Outcome: Ongoing, Progressing     Problem: Adjustment to Illness (Sepsis/Septic Shock)  Goal: Optimal Coping  Outcome: Ongoing, Progressing     Problem: Bleeding (Sepsis/Septic Shock)  Goal: Absence of Bleeding  Outcome: Ongoing, Progressing     Problem: Glycemic Control Impaired (Sepsis/Septic Shock)  Goal: Blood Glucose Level Within Desired Range  Outcome: Ongoing, Progressing     Problem: Infection Progression (Sepsis/Septic Shock)  Goal: Absence of Infection Signs and Symptoms  Outcome: Ongoing, Progressing     Problem: Nutrition Impaired (Sepsis/Septic Shock)  Goal: Optimal Nutrition Intake  Outcome: Ongoing, Progressing

## 2023-08-21 NOTE — PROGRESS NOTES
[facility] - Telemetry ([facility])  Hematology/Oncology  Progress Note    Patient Name: Benny Lorenzo  Admission Date: 8/19/2023  Hospital Length of Stay: 1 days  Code Status: Full Code     Subjective:     HPI:  38-year-old female found to be pants cytopenic with low hemoglobin as well as platelet count.  Rule out COVID-19 COVID-19 patient is found to be anemic as well as thrombocytopenic we were asked to see the patient for further evaluation unable to see patient physically because of COVID-19 precautions      Interval History:  Patient remains in COVID isolation    Oncology Treatment Plan:   [No matching plan found]    Medications:  Continuous Infusions:  Scheduled Meds:   cefTRIAXone (ROCEPHIN) IVPB  2 g Intravenous Q24H     PRN Meds:0.9%  NaCl infusion (for blood administration), 0.9%  NaCl infusion (for blood administration), acetaminophen, sodium chloride 0.9%     Review of Systems   Unable to perform ROS: Other     Objective:     Vital Signs (Most Recent):  Temp: 98.1 °F (36.7 °C) (08/21/23 0717)  Pulse: 74 (08/21/23 0717)  Resp: 17 (08/21/23 0717)  BP: 117/67 (08/21/23 0717)  SpO2: 100 % (08/21/23 0717) Vital Signs (24h Range):  Temp:  [98.1 °F (36.7 °C)-102.2 °F (39 °C)] 98.1 °F (36.7 °C)  Pulse:  [72-98] 74  Resp:  [16-19] 17  SpO2:  [94 %-100 %] 100 %  BP: (103-125)/(52-68) 117/67     Weight: 57.3 kg (126 lb 5.2 oz)  Body mass index is 21.02 kg/m².  Body surface area is 1.62 meters squared.      Intake/Output Summary (Last 24 hours) at 8/21/2023 0730  Last data filed at 8/21/2023 0335  Gross per 24 hour   Intake 240 ml   Output --   Net 240 ml        Physical Exam  Vitals reviewed.          Significant Labs:   BMP:   Recent Labs   Lab 08/19/23  1201 08/19/23  1354 08/19/23  1610   *  --  110   *  --  134*   K 3.0*  --  3.8     --  102   CO2 19*  --  21*   BUN 4*  --  4*   CREATININE 0.9  --  0.8   CALCIUM 8.9  --  8.4*   MG  --  1.4* 2.5   , CBC:   Recent Labs   Lab 08/19/23  1613  "08/20/23  0520 08/20/23  0947   WBC 4.79 3.97 4.33   HGB 5.4* 6.8* 6.8*   HCT 20.4* 24.5* 24.7*   PLT 22* 23* 24*   , CMP:   Recent Labs   Lab 08/19/23  1201 08/19/23  1610   * 134*   K 3.0* 3.8    102   CO2 19* 21*   * 110   BUN 4* 4*   CREATININE 0.9 0.8   CALCIUM 8.9 8.4*   PROT 8.4 6.9   ALBUMIN 3.9 3.2*   BILITOT 1.3* 1.2*   ALKPHOS 88 73   AST 20 15   ALT 33 25   ANIONGAP 12 11   , Coagulation: No results for input(s): "PT", "INR", "APTT" in the last 48 hours., Haptoglobin: No results for input(s): "HAPTOGLOBIN" in the last 48 hours., Immunology: No results for input(s): "SPEP", "ELVA", "DONELL", "FREELAMBDALI" in the last 48 hours., LDH: No results for input(s): "LDHCSF", "BFSOURCE" in the last 48 hours., LFTs:   Recent Labs   Lab 08/19/23  1201 08/19/23  1610   ALT 33 25   AST 20 15   ALKPHOS 88 73   BILITOT 1.3* 1.2*   PROT 8.4 6.9   ALBUMIN 3.9 3.2*   , Reticulocytes: No results for input(s): "RETIC" in the last 48 hours., Tumor Markers: No results for input(s): "PSA", "CEA", "", "AFPTM", "DI5664", "" in the last 48 hours.    Invalid input(s): "ALGTM", Uric Acid No results for input(s): "URICACID" in the last 48 hours., and Urine Studies:   Recent Labs   Lab 08/19/23  1126   COLORU Yellow   APPEARANCEUA Clear   PHUR 6.0   SPECGRAV 1.015   PROTEINUA 2+*   GLUCUA Negative   KETONESU Negative   BILIRUBINUA Negative   OCCULTUA 1+*   NITRITE Negative   UROBILINOGEN 4.0-6.0*   LEUKOCYTESUR Trace*   RBCUA 3   WBCUA 10*   BACTERIA Rare   HYALINECASTS 0       Diagnostic Results:  I have reviewed all pertinent imaging results/findings within the past 24 hours.    Assessment/Plan:     * Anemia  Patient has documented iron deficiency also MCV is quite low.  This high likelihood patient has thalassemia as well but will would please intravenous iron to see where her platelet count stabilizes at but may be related to hypersplenism do not feel dexamethasone is needed at this particular point " unless for inflammatory response    Thrombocytopenia  Thrombocytopenia may be related to hypersplenism.  With splenomegaly and fatty infiltration of liver.  Unusual to have ITP with splenomegaly.  Would recommend blue top tube for confirmation.  Patient also is severely iron deficient.  Would recommend repletion of IV iron occasionally you see thrombocytopenia with severe iron deficiency as this patient is agree with Venofer would probably escalate dose to 200 mg 1 time dose in hospital and follow CBC if no bleeding do not need to intervene at this point  08/21/2023.  Results of blue top tube [place] of [id number].  Continue to monitor CBC on daily basis.  At this point iron repletion most efficient course of action    SOB (shortness of breath)  Patient is anemic agree with intravenous iron at present time    Sepsis  Cared for by primary care team        Thank you for your consult. I will follow-up with patient. Please contact us if you have any additional questions.     Eder Centeno MD  Hematology/Oncology  [facility] - Telemetry ([facility])

## 2023-08-21 NOTE — PROGRESS NOTES
[facility] Medicine  Progress Note     Patient Name: Benny Lorenzo  MRN: [id number]  Patient Class: OP- Observation          Admission Date: 8/19/2023  Length of Stay: 2 days  Attending Physician: Ravindra Haynes MD  Primary Care Provider: Debra, Primary Doctor           Subjective:      Principal Problem:Anemia     HPI:  38F  has a past medical history of Anemia, unspecified, anxiety and depression. Presents to emergency department with fever, diaphoresis, myalgias, trouble breathing. Positive covid exposure on Tuesday. Associated with abdominal pain, sore throat, ear ache. Denies psh gastric bypass. Last bowel movement this am. Has been having 2 menstrual cycles per month. Does not take iron supplements. No longer taking anxiety and depression medication(s).     Initial workup in emergency department revealed fever, tachycardia, and tachypnea. Cbc with significant anemia hb/hct 6.2/23.5 and thrombocytopenia of 49k. Cmp with hyponatremia, hypokalemia, nonanion gap metabolic acidosis, hypomagnesemia. Lactate within normal limits. Influenza and covid negative. Urinalysis with trace leuks and occult blood, 1+. Preg test negative. Chest x-ray unremarkable. Ct abdomen pelvis without contrast with concerns for nonobsructing intussusception, splenomegaly. Us pelvis pending.     Hospital medicine consulted for admission under observation.  Symptomatic anemia, sirs, intussusception         Overview/Hospital Course:  8/20 admitted for fever, covid exposure, hypokalemia. Remains febrile. Ecoli bacteremia. Initiated on intravenous rocephin. Discontinue iron. Transfused one unit each of prbcs and platelets. Hem and gyn consulted. Recommendations reviewed.        Interval History  Afebrile x 24 hours. Says she feels somewhat weak, but overall doing ok. Denies any fevers, chills, chest pain, shortness a breath.  Cultures repeated this morning to test for resolution.     Objective  /67   Pulse 74  "  Temp 98.1 °F (36.7 °C)   Resp 17   Ht 5' 5" (1.651 m)   Wt 57.3 kg (126 lb 5.2 oz)   SpO2 100%   Breastfeeding No   BMI 21.02 kg/m²     Intake/Output Summary (Last 24 hours) at 8/21/2023 0822  Last data filed at 8/21/2023 0335  Gross per 24 hour   Intake 240 ml   Output --   Net 240 ml       PHYSICAL EXAM  Constitutional:       General: She is not in acute distress.     Appearance: She is ill-appearing. She is not toxic-appearing.   HENT:      Head: Normocephalic and atraumatic.   Cardiovascular:      Rate and Rhythm: Normal rate.   Pulmonary:      Effort: Pulmonary effort is normal. No respiratory distress.   Abdominal:      Palpations: Abdomen is soft.      Tenderness: There is abdominal tenderness. There is left CVA tenderness. There is no right CVA tenderness.   Musculoskeletal:      Right lower leg: No edema.      Left lower leg: No edema.   Skin:     General: Skin is warm.   Neurological:      Mental Status: She is alert and oriented to person, place, and time.      Motor: Weakness present.   Psychiatric:         Mood and Affect: Mood is anxious.         Speech: Speech normal.         Behavior: Behavior normal. Behavior is cooperative.         Cognition and Memory: Cognition and memory normal.       BMP:   Recent Labs   Lab 08/19/23  1610      *   K 3.8      CO2 21*   BUN 4*   CREATININE 0.8   CALCIUM 8.4*   MG 2.5     CBC:   Recent Labs   Lab 08/19/23  1610 08/20/23  0520 08/20/23  0947   WBC 4.79 3.97 4.33   HGB 5.4* 6.8* 6.8*   HCT 20.4* 24.5* 24.7*   PLT 22* 23* 24*     CMP:   Recent Labs   Lab 08/19/23  1201 08/19/23  1610   * 134*   K 3.0* 3.8    102   CO2 19* 21*   * 110   BUN 4* 4*   CREATININE 0.9 0.8   CALCIUM 8.9 8.4*   PROT 8.4 6.9   ALBUMIN 3.9 3.2*   BILITOT 1.3* 1.2*   ALKPHOS 88 73   AST 20 15   ALT 33 25   ANIONGAP 12 11     Cardiac Markers: No results for input(s): "CKMB", "MYOGLOBIN", "BNP", "TROPISTAT" in the last 48 hours.  Coagulation: No " "results for input(s): "PT", "INR", "APTT" in the last 48 hours.  Lactic Acid:   Recent Labs   Lab 08/19/23  1201 08/19/23  1354   LACTATE 2.0 1.2     Magnesium:   Recent Labs   Lab 08/19/23  1354 08/19/23  1610   MG 1.4* 2.5     Troponin: No results for input(s): "TROPONINI", "TROPONINIHS" in the last 48 hours.  TSH:   No results for input(s): "TSH" in the last 4320 hours.  Urine Studies:   Recent Labs   Lab 08/19/23  1126   COLORU Yellow   APPEARANCEUA Clear   PHUR 6.0   SPECGRAV 1.015   PROTEINUA 2+*   GLUCUA Negative   KETONESU Negative   BILIRUBINUA Negative   OCCULTUA 1+*   NITRITE Negative   UROBILINOGEN 4.0-6.0*   LEUKOCYTESUR Trace*   RBCUA 3   WBCUA 10*   BACTERIA Rare   HYALINECASTS 0       Imaging Results              US Pelvis Complete Non OB (Final result)  Result time 08/19/23 20:11:50   Procedure changed from US Pelvis Comp with Transvag NON-OB (xpd     Final result by eNil Rendon MD (08/19/23 20:11:50)                   Impression:      Nonvisualization of bilateral ovaries.  The uterus was unremarkable.      Electronically signed by: Neil Rendon  Date:    08/19/2023  Time:    20:11               Narrative:    EXAMINATION:  US PELVIS COMPLETE NON OB    CLINICAL HISTORY:  anemia;    TECHNIQUE:  Pelvic ultrasound    COMPARISON:  None    FINDINGS:  The uterus measures 9.5 x 5.8 x 6.3 cm.  Endometrial stripe measures 7.3 mm.  The right ovary was not visualized.  The left ovary was not visualized.  Trace amount of free fluid in the dependent portion of pelvis.                                       CT Abdomen Pelvis With Contrast (Final result)  Result time 08/19/23 18:52:07      Final result by Neil Rendon MD (08/19/23 18:52:07)                   Impression:      Fatty infiltration of liver.    Splenomegaly    Right-sided renal pelviectasis    Nonvisualization of the appendix    All CT scans at this facility use dose modulation, iterative reconstruction, and/or weight based dosing when " appropriate to reduce radiation dose to as low as reasonably achievable.      Electronically signed by: Neil Rendon  Date:    08/19/2023  Time:    18:52               Narrative:    EXAMINATION:  CT ABDOMEN PELVIS WITH CONTRAST    CLINICAL HISTORY:  Abdominal pain, acute, nonlocalized;    TECHNIQUE:  Low dose axial images, sagittal and coronal reformations were obtained from the lung bases to the pubic symphysis following the IV administration of 100 mL of Omnipaque 350.    COMPARISON:  None    FINDINGS:  Heart: Normal size as far as seen. No effusion as far as seen.    Lung Bases: Clear.    Liver: Fatty infiltration of liver.  No focal lesions.    Gallbladder: No calcified gallstones.    Bile Ducts: No dilatation.    Pancreas: No mass. No peripancreatic fat stranding.    Spleen: Splenomegaly.    Adrenals: Normal.    Kidneys/Ureters: Right-sided pelviectasis.  The course and contour of the ureter is not well evaluated.    Bladder: No wall thickening.    Reproductive organs: Normal.    GI Tract/Mesentery: No evidence of bowel obstruction or inflammation.  No secondary evidence of acute appendicitis.  The appendix is not identified.    Peritoneal Space: No ascites or free air.    Retroperitoneum: No significant adenopathy.    Abdominal wall: Normal.    Vasculature: No aneurysm.    Bones: No acute fracture. No suspicious lytic or sclerotic lesions.                                       CT Abdomen Pelvis  Without Contrast (Final result)  Result time 08/19/23 15:28:39      Final result by Mirela Greenfield MD (08/19/23 15:28:39)                   Impression:      Anemia, splenomegaly, fullness of the upper right renal collecting system and jejuno jejunal intussusception without proximal obstructive findings      Electronically signed by: Mirela Greenfield  Date:    08/19/2023  Time:    15:28               Narrative:    EXAMINATION:  CT ABDOMEN PELVIS WITHOUT CONTRAST    CLINICAL HISTORY:  Abdominal abscess/infection  suspected;    TECHNIQUE:  Low dose axial images, sagittal and coronal reformations were obtained from the lung bases to the pubic symphysis.  Contrast was not administered.    COMPARISON:  None    FINDINGS:  Imaging degraded by streak artifact related to extraneous removable lines.  Liver grossly normal size    Spleen enlarged    Gallbladder present    Pancreas without overt pathology.    Borderline right hydronephrosis as visualized with artifact.  Left kidney without hydronephrosis.    Urinary bladder moderately distended.    No overt obstructive bowel findings.  Jejuno jejunal intussusception.  Mid to distal small bowel contains fluid without significant distension.  Proximal colon contains fluid without distension.  Rectal stool present without distension.    Scant ascites    Anemia suggested                                       X-Ray Chest AP Portable (Final result)  Result time 08/19/23 12:48:09      Final result by Scout Pastrana III, MD (08/19/23 12:48:09)                   Impression:      No acute abnormality.      Electronically signed by: El Pastrana  Date:    08/19/2023  Time:    12:48               Narrative:    EXAMINATION:  XR CHEST AP PORTABLE    CLINICAL HISTORY:  Sepsis;.    TECHNIQUE:  Single frontal portable view of the chest was performed.    COMPARISON:  None    FINDINGS:  Support devices: None    The lungs are clear, with normal appearance of pulmonary vasculature and no pleural effusion or pneumothorax.    The cardiac silhouette is normal in size. The hilar and mediastinal contours are unremarkable.    Bones are intact.                                        Assessment/Plan:      * Anemia  Thrombocytopenia  Transfuse blood  hb/hct responded   Menorrhagia   Gyn consulted and recommended provera during menstrual cycle to control bleeding  Hem consulted and recommended iron supplementation  Patient received iron  Will defer additional iron sources until repeat blood cultures  and intravenous rocephin    08/21/2023  -platelets 24k on AM CBC  -Heme/onc following, suspect undiagnosed thalassemia  -continue IV iron per recs  -trend CBC daily      E coli bacteremia  Source control  Continue intravenous rocephin  Repeat blood cultures needed    08/21/2023  -Growth in 1 of 2 bottles only  -continue Rocephin  -repeat cultures ordered this AM to test for resolution     Menorrhagia with regular cycle  Gyn consulted  Provera recommended and prescribed     08/21/2023   No bleeding at this encounter  Plan as above  Trend CBC daily while hospitalized  Follow up with Dr. Bobby in outpatient setting        SOB (shortness of breath)  Breathing treatment prn  Chest x-ray prn         Hypokalemia  Mg and k repleted           Hyponatremia  Patient has hyponatremia which is controlled,We will aim to correct the sodium by 4-6mEq in 24 hours. We will monitor sodium Every 12 hours. The hyponatremia is due to Dehydration/hypovolemia. We will obtain the following studies: TSH, T4. We will treat the hyponatremia with IV fluids as follows: normal saline. The patient's sodium results have been reviewed and are listed below.      Recent Labs   Lab 08/19/23  1610   *      Improving      Exposure to COVID-19 virus  covid negative but symptomatic with fever, dyspnea, myalgias  Symptoms started on Tuesday   Remains febrile  covid precautions clinically indicated  Consider repeating covid test and discontinuing precautions if negative   Supportive measures     Sepsis  This patient does have evidence of infective focus  My overall impression is sepsis.  Source: Unknown  Antibiotics given-             Antibiotics (72h ago, onward)     Start     Stop Route Frequency Ordered     08/20/23 1530   cefTRIAXone (ROCEPHIN) 2 g in dextrose 5 % in water (D5W) 100 mL IVPB (MB+)         -- IV Every 24 hours (non-standard times) 08/20/23 1418          Latest lactate reviewed-      Recent Labs   Lab 08/19/23  1354   LACTATE 1.2       Organ dysfunction indicated by Thrombocytopenia      Fluid challenge Not needed - patient is not hypotensive       Post- resuscitation assessment Yes Perfusion exam was performed within 6 hours of septic shock presentation after bolus shows Adequate tissue perfusion assessed by non-invasive monitoring         Will Not start Pressors- Levophed for MAP of 65  Source control achieved by: intravenous rocephin  Based on tachycardia, tachypnea, fever, thrombocytopenia  Sofa 3  Received rocephin in emergency department  Source control  Continue intravenous rocephin  Will need repeat blood cultures            VTE Risk Mitigation (From admission, onward)     None             Discharge Planning   ROLAND:      Code Status: Full Code   Is the patient medically ready for discharge?:     Reason for patient still in hospital (select all that apply): Patient new problem, Patient trending condition, Laboratory test, Treatment and Consult recommendations    Dispo: Follow up labs, cultures     Ravindra Haynes MD  Department of Hospital Medicine   [facility] - Telemetry ([facility])

## 2023-08-21 NOTE — PLAN OF CARE
Problem: Adult Inpatient Plan of Care  Goal: Plan of Care Review  Outcome: Ongoing, Progressing  Goal: Patient-Specific Goal (Individualized)  Outcome: Ongoing, Progressing  Goal: Absence of Hospital-Acquired Illness or Injury  Outcome: Ongoing, Progressing  Goal: Optimal Comfort and Wellbeing  Outcome: Ongoing, Progressing  Goal: Readiness for Transition of Care  Outcome: Ongoing, Progressing     Problem: Fatigue  Goal: Improved Activity Tolerance  Outcome: Ongoing, Progressing     Problem: Infection  Goal: Absence of Infection Signs and Symptoms  Outcome: Ongoing, Progressing     Problem: Adjustment to Illness (Sepsis/Septic Shock)  Goal: Optimal Coping  Outcome: Ongoing, Progressing     Problem: Bleeding (Sepsis/Septic Shock)  Goal: Absence of Bleeding  Outcome: Ongoing, Progressing     Problem: Glycemic Control Impaired (Sepsis/Septic Shock)  Goal: Blood Glucose Level Within Desired Range  Outcome: Ongoing, Progressing     Problem: Infection Progression (Sepsis/Septic Shock)  Goal: Absence of Infection Signs and Symptoms  Outcome: Ongoing, Progressing     Problem: Nutrition Impaired (Sepsis/Septic Shock)  Goal: Optimal Nutrition Intake  Outcome: Ongoing, Progressing

## 2023-08-21 NOTE — TELEPHONE ENCOUNTER
----- Message from Dana Bobby MD sent at 8/20/2023 10:52 AM CDT -----  Regarding: hospital follow-up  Please arrange follow-up with me for management of heavy bleeding within the next month.  Pt was admitted for symptomatic anemia.

## 2023-08-21 NOTE — PLAN OF CARE
[facility] - Telemetry (Hospital)  Initial Discharge Assessment       Primary Care Provider: No, Primary Doctor    Admission Diagnosis: Hypokalemia [E87.6]  Hypomagnesemia [E83.42]  SOB (shortness of breath) [R06.02]  Symptomatic anemia [D64.9]  Urinary tract infection without hematuria, site unspecified [N39.0]  Upper respiratory tract infection, unspecified type [J06.9]    Admission Date: 8/19/2023  Expected Discharge Date:     Transition of Care Barriers: Unisured    Payor: /     Extended Emergency Contact Information  Primary Emergency Contact: YOVANA FERGUSON  Mobile Phone: [phone]  Relation: Significant other  Preferred language: English   needed? No    Discharge Plan A: Home with family         [facility] [address] [place], [place] [address] [place] [facility] [address] [facility]  [address] [facility] [address]  Phone: [phone] Fax: [phone]      Initial Assessment (most recent)       Adult Discharge Assessment - 08/21/23 1550          Discharge Assessment    Assessment Type Discharge Planning Assessment     Confirmed/corrected address, phone number and insurance Yes     Confirmed Demographics Correct on Facesheet     Source of Information health record     Communicated ROLAND with patient/caregiver Date not available/Unable to determine     Reason For Admission anemia     People in Home significant other     Do you expect to return to your current living situation? Yes     Do you have help at home or someone to help you manage your care at home? Yes     Who are your caregiver(s) and their phone number(s)? YOVANA FERGUSON (Significant other)     Prior to hospitilization cognitive status: Alert/Oriented     Current cognitive status: Alert/Oriented     Home Accessibility wheelchair accessible     Home Layout Able to live on 1st floor     Equipment Currently Used at Home none     Readmission within 30 days? No     Patient currently being followed by outpatient case management? No     Do you currently have  service(s) that help you manage your care at home? No     Do you take prescription medications? No     Do you have prescription coverage? No     Who is going to help you get home at discharge? YOVANA FERGUSON (Significant other)     How do you get to doctors appointments? car, drives self     Are you on dialysis? No     Do you take coumadin? No     DME Needed Upon Discharge  none     Discharge Plan discussed with: Patient     Transition of Care Barriers Unisured     Discharge Plan A Home with family                   Anticipated DC dispo: home   Prior Level of Function: independent with ADLs   People in home: significant other      Comments:  Chart review completed for discharge planning. Significant other will be help at home and can provide transport at time of discharge. Patient does not use any DME at home. Confirmed demographics, insurance, and emergency contacts. CM discharge needs depends on hospital progress. CM will continue following to assist with other needs.

## 2023-08-21 NOTE — SUBJECTIVE & OBJECTIVE
"Interval History:  Patient remains in COVID isolation    Oncology Treatment Plan:   [No matching plan found]    Medications:  Continuous Infusions:  Scheduled Meds:   cefTRIAXone (ROCEPHIN) IVPB  2 g Intravenous Q24H     PRN Meds:0.9%  NaCl infusion (for blood administration), 0.9%  NaCl infusion (for blood administration), acetaminophen, sodium chloride 0.9%     Review of Systems   Unable to perform ROS: Other     Objective:     Vital Signs (Most Recent):  Temp: 98.1 °F (36.7 °C) (08/21/23 0717)  Pulse: 74 (08/21/23 0717)  Resp: 17 (08/21/23 0717)  BP: 117/67 (08/21/23 0717)  SpO2: 100 % (08/21/23 0717) Vital Signs (24h Range):  Temp:  [98.1 °F (36.7 °C)-102.2 °F (39 °C)] 98.1 °F (36.7 °C)  Pulse:  [72-98] 74  Resp:  [16-19] 17  SpO2:  [94 %-100 %] 100 %  BP: (103-125)/(52-68) 117/67     Weight: 57.3 kg (126 lb 5.2 oz)  Body mass index is 21.02 kg/m².  Body surface area is 1.62 meters squared.      Intake/Output Summary (Last 24 hours) at 8/21/2023 0730  Last data filed at 8/21/2023 0335  Gross per 24 hour   Intake 240 ml   Output --   Net 240 ml        Physical Exam  Vitals reviewed.          Significant Labs:   BMP:   Recent Labs   Lab 08/19/23  1201 08/19/23  1354 08/19/23  1610   *  --  110   *  --  134*   K 3.0*  --  3.8     --  102   CO2 19*  --  21*   BUN 4*  --  4*   CREATININE 0.9  --  0.8   CALCIUM 8.9  --  8.4*   MG  --  1.4* 2.5   , CBC:   Recent Labs   Lab 08/19/23  1610 08/20/23  0520 08/20/23  0947   WBC 4.79 3.97 4.33   HGB 5.4* 6.8* 6.8*   HCT 20.4* 24.5* 24.7*   PLT 22* 23* 24*   , CMP:   Recent Labs   Lab 08/19/23  1201 08/19/23  1610   * 134*   K 3.0* 3.8    102   CO2 19* 21*   * 110   BUN 4* 4*   CREATININE 0.9 0.8   CALCIUM 8.9 8.4*   PROT 8.4 6.9   ALBUMIN 3.9 3.2*   BILITOT 1.3* 1.2*   ALKPHOS 88 73   AST 20 15   ALT 33 25   ANIONGAP 12 11   , Coagulation: No results for input(s): "PT", "INR", "APTT" in the last 48 hours., Haptoglobin: No results for " "input(s): "HAPTOGLOBIN" in the last 48 hours., Immunology: No results for input(s): "SPEP", "ELVA", "DONELL", "FREELAMBDALI" in the last 48 hours., LDH: No results for input(s): "LDHCSF", "BFSOURCE" in the last 48 hours., LFTs:   Recent Labs   Lab 08/19/23  1201 08/19/23  1610   ALT 33 25   AST 20 15   ALKPHOS 88 73   BILITOT 1.3* 1.2*   PROT 8.4 6.9   ALBUMIN 3.9 3.2*   , Reticulocytes: No results for input(s): "RETIC" in the last 48 hours., Tumor Markers: No results for input(s): "PSA", "CEA", "", "AFPTM", "FG8445", "" in the last 48 hours.    Invalid input(s): "ALGTM", Uric Acid No results for input(s): "URICACID" in the last 48 hours., and Urine Studies:   Recent Labs   Lab 08/19/23  1126   COLORU Yellow   APPEARANCEUA Clear   PHUR 6.0   SPECGRAV 1.015   PROTEINUA 2+*   GLUCUA Negative   KETONESU Negative   BILIRUBINUA Negative   OCCULTUA 1+*   NITRITE Negative   UROBILINOGEN 4.0-6.0*   LEUKOCYTESUR Trace*   RBCUA 3   WBCUA 10*   BACTERIA Rare   HYALINECASTS 0       Diagnostic Results:  I have reviewed all pertinent imaging results/findings within the past 24 hours.  "

## 2023-08-21 NOTE — ASSESSMENT & PLAN NOTE
Thrombocytopenia may be related to hypersplenism.  With splenomegaly and fatty infiltration of liver.  Unusual to have ITP with splenomegaly.  Would recommend blue top tube for confirmation.  Patient also is severely iron deficient.  Would recommend repletion of IV iron occasionally you see thrombocytopenia with severe iron deficiency as this patient is agree with Venofer would probably escalate dose to 200 mg 1 time dose in hospital and follow CBC if no bleeding do not need to intervene at this point  08/21/2023.  Results of blue top tube is platelet [place] of [id number].  Continue to monitor CBC on daily basis.  At this point iron repletion most efficient course of action

## 2023-08-22 LAB
ACANTHOCYTES BLD QL SMEAR: PRESENT
ALBUMIN SERPL BCP-MCNC: 3.2 G/DL (ref 3.5–5.2)
ALP SERPL-CCNC: 98 U/L (ref 55–135)
ALT SERPL W/O P-5'-P-CCNC: 37 U/L (ref 10–44)
ANION GAP SERPL CALC-SCNC: 8 MMOL/L (ref 8–16)
ANISOCYTOSIS BLD QL SMEAR: ABNORMAL
AST SERPL-CCNC: 40 U/L (ref 10–40)
BACTERIA BLD CULT: ABNORMAL
BASOPHILS # BLD AUTO: 0.06 K/UL (ref 0–0.2)
BASOPHILS NFR BLD: 1.7 % (ref 0–1.9)
BILIRUB SERPL-MCNC: 0.6 MG/DL (ref 0.1–1)
BUN SERPL-MCNC: 3 MG/DL (ref 6–20)
CALCIUM SERPL-MCNC: 8.7 MG/DL (ref 8.7–10.5)
CHLORIDE SERPL-SCNC: 108 MMOL/L (ref 95–110)
CO2 SERPL-SCNC: 20 MMOL/L (ref 23–29)
CREAT SERPL-MCNC: 0.7 MG/DL (ref 0.5–1.4)
DACRYOCYTES BLD QL SMEAR: ABNORMAL
DIFFERENTIAL METHOD: ABNORMAL
EOSINOPHIL # BLD AUTO: 0.1 K/UL (ref 0–0.5)
EOSINOPHIL NFR BLD: 3.1 % (ref 0–8)
ERYTHROCYTE [DISTWIDTH] IN BLOOD BY AUTOMATED COUNT: 34.1 % (ref 11.5–14.5)
EST. GFR  (NO RACE VARIABLE): >60 ML/MIN/1.73 M^2
GLUCOSE SERPL-MCNC: 82 MG/DL (ref 70–110)
HCT VFR BLD AUTO: 27.3 % (ref 37–48.5)
HGB BLD-MCNC: 7.2 G/DL (ref 12–16)
HYPOCHROMIA BLD QL SMEAR: ABNORMAL
IMM GRANULOCYTES # BLD AUTO: 0.1 K/UL (ref 0–0.04)
IMM GRANULOCYTES NFR BLD AUTO: 2.8 % (ref 0–0.5)
LYMPHOCYTES # BLD AUTO: 1.1 K/UL (ref 1–4.8)
LYMPHOCYTES NFR BLD: 32.3 % (ref 18–48)
MCH RBC QN AUTO: 18.5 PG (ref 27–31)
MCHC RBC AUTO-ENTMCNC: 26.4 G/DL (ref 32–36)
MCV RBC AUTO: 70 FL (ref 82–98)
MONOCYTES # BLD AUTO: 0.3 K/UL (ref 0.3–1)
MONOCYTES NFR BLD: 9.1 % (ref 4–15)
NEUTROPHILS # BLD AUTO: 1.8 K/UL (ref 1.8–7.7)
NEUTROPHILS NFR BLD: 51 % (ref 38–73)
NRBC BLD-RTO: 0 /100 WBC
OVALOCYTES BLD QL SMEAR: ABNORMAL
PLATELET # BLD AUTO: 110 K/UL (ref 150–450)
PLATELET BLD QL SMEAR: ABNORMAL
PMV BLD AUTO: ABNORMAL FL (ref 9.2–12.9)
POIKILOCYTOSIS BLD QL SMEAR: SLIGHT
POLYCHROMASIA BLD QL SMEAR: ABNORMAL
POTASSIUM SERPL-SCNC: 3.8 MMOL/L (ref 3.5–5.1)
PROT SERPL-MCNC: 7.3 G/DL (ref 6–8.4)
RBC # BLD AUTO: 3.9 M/UL (ref 4–5.4)
SCHISTOCYTES BLD QL SMEAR: ABNORMAL
SCHISTOCYTES BLD QL SMEAR: PRESENT
SODIUM SERPL-SCNC: 136 MMOL/L (ref 136–145)
WBC # BLD AUTO: 3.53 K/UL (ref 3.9–12.7)

## 2023-08-22 PROCEDURE — 25000003 PHARM REV CODE 250: Performed by: NURSE PRACTITIONER

## 2023-08-22 PROCEDURE — 36415 COLL VENOUS BLD VENIPUNCTURE: CPT | Performed by: STUDENT IN AN ORGANIZED HEALTH CARE EDUCATION/TRAINING PROGRAM

## 2023-08-22 PROCEDURE — 80053 COMPREHEN METABOLIC PANEL: CPT | Performed by: STUDENT IN AN ORGANIZED HEALTH CARE EDUCATION/TRAINING PROGRAM

## 2023-08-22 PROCEDURE — 63600175 PHARM REV CODE 636 W HCPCS: Performed by: FAMILY MEDICINE

## 2023-08-22 PROCEDURE — 85025 COMPLETE CBC W/AUTO DIFF WBC: CPT | Performed by: STUDENT IN AN ORGANIZED HEALTH CARE EDUCATION/TRAINING PROGRAM

## 2023-08-22 PROCEDURE — 25000003 PHARM REV CODE 250: Performed by: FAMILY MEDICINE

## 2023-08-22 PROCEDURE — 99231 SBSQ HOSP IP/OBS SF/LOW 25: CPT | Mod: ,,, | Performed by: INTERNAL MEDICINE

## 2023-08-22 PROCEDURE — 21400001 HC TELEMETRY ROOM

## 2023-08-22 PROCEDURE — 99231 PR SUBSEQUENT HOSPITAL CARE,LEVL I: ICD-10-PCS | Mod: ,,, | Performed by: INTERNAL MEDICINE

## 2023-08-22 RX ORDER — HYDROCODONE BITARTRATE AND ACETAMINOPHEN 5; 325 MG/1; MG/1
1 TABLET ORAL EVERY 6 HOURS PRN
Status: DISCONTINUED | OUTPATIENT
Start: 2023-08-22 | End: 2023-08-23 | Stop reason: HOSPADM

## 2023-08-22 RX ADMIN — HYDROCODONE BITARTRATE AND ACETAMINOPHEN 1 TABLET: 5; 325 TABLET ORAL at 09:08

## 2023-08-22 RX ADMIN — CEFTRIAXONE 2 G: 2 INJECTION, POWDER, FOR SOLUTION INTRAMUSCULAR; INTRAVENOUS at 02:08

## 2023-08-22 NOTE — PLAN OF CARE
Problem: Adult Inpatient Plan of Care  Goal: Plan of Care Review  Outcome: Ongoing, Progressing  Goal: Absence of Hospital-Acquired Illness or Injury  Outcome: Ongoing, Progressing     Problem: Fatigue  Goal: Improved Activity Tolerance  Outcome: Ongoing, Progressing     Problem: Infection  Goal: Absence of Infection Signs and Symptoms  Outcome: Ongoing, Progressing     Problem: Bleeding (Sepsis/Septic Shock)  Goal: Absence of Bleeding  Outcome: Ongoing, Progressing

## 2023-08-22 NOTE — ASSESSMENT & PLAN NOTE
Thrombocytopenia may be related to hypersplenism.  With splenomegaly and fatty infiltration of liver.  Unusual to have ITP with splenomegaly.  Would recommend blue top tube for confirmation.  Patient also is severely iron deficient.  Would recommend repletion of IV iron occasionally you see thrombocytopenia with severe iron deficiency as this patient is agree with Venofer would probably escalate dose to 200 mg 1 time dose in hospital and follow CBC if no bleeding do not need to intervene at this point  08/21/2023.  Results of blue top tube is [place] of 168[address].  Continue to monitor CBC on daily basis.  At this point iron repletion most efficient course of action  08/23/2023 dramatic increase in patient's platelet count.  After dose of iron.  Will continue to observe.  Will need additional intravenous iron in office if patient remains in hospital Venofer 200 mg on a weekly basis will be beneficial

## 2023-08-22 NOTE — PROGRESS NOTES
[facility] Medicine  Progress Note     Patient Name: Benny Lorenzo  MRN: [id number]  Patient Class: OP- Observation          Admission Date: 8/19/2023  Length of Stay: 3 days  Attending Physician: Ravindra Haynes MD  Primary Care Provider: Debra, Primary Doctor           Subjective:      Principal Problem:Anemia     HPI:  38F  has a past medical history of Anemia, unspecified, anxiety and depression. Presents to emergency department with fever, diaphoresis, myalgias, trouble breathing. Positive covid exposure on Tuesday. Associated with abdominal pain, sore throat, ear ache. Denies psh gastric bypass. Last bowel movement this am. Has been having 2 menstrual cycles per month. Does not take iron supplements. No longer taking anxiety and depression medication(s).     Initial workup in emergency department revealed fever, tachycardia, and tachypnea. Cbc with significant anemia hb/hct 6.2/23.5 and thrombocytopenia of 49k. Cmp with hyponatremia, hypokalemia, nonanion gap metabolic acidosis, hypomagnesemia. Lactate within normal limits. Influenza and covid negative. Urinalysis with trace leuks and occult blood, 1+. Preg test negative. Chest x-ray unremarkable. Ct abdomen pelvis without contrast with concerns for nonobsructing intussusception, splenomegaly. Us pelvis pending.     Hospital medicine consulted for admission under observation.  Symptomatic anemia, sirs, intussusception         Overview/Hospital Course:  8/20 admitted for fever, covid exposure, hypokalemia. Remains febrile. Ecoli bacteremia. Initiated on intravenous rocephin. Discontinue iron. Transfused one unit each of prbcs and platelets. Hem and gyn consulted. Recommendations reviewed.     8/21: Afebrile x 24 hours. Says she feels somewhat weak, but overall doing ok. Denies any fevers, chills, chest pain, shortness a breath.  Cultures repeated this morning to test for resolution.     8/22: No acute events overnight. Doing well  "this AM with no complaints at our encounter. Denies CP, SOB, Abdominal pain, fevers/chills.       Interval History  See Hospital course    Objective  BP 97/62   Pulse 61   Temp 98.3 °F (36.8 °C)   Resp 17   Ht 5' 5" (1.651 m)   Wt 57.3 kg (126 lb 5.2 oz)   SpO2 99%   Breastfeeding No   BMI 21.02 kg/m²   No intake or output data in the 24 hours ending 08/22/23 0714    PHYSICAL EXAM  Constitutional:       General: She is not in acute distress.     Appearance: She is ill-appearing. She is not toxic-appearing.   HENT:      Head: Normocephalic and atraumatic.   Cardiovascular:      Rate and Rhythm: Normal rate.   Pulmonary:      Effort: Pulmonary effort is normal. No respiratory distress.   Abdominal:      Palpations: Abdomen is soft.      Tenderness: There is abdominal tenderness. There is left CVA tenderness. There is no right CVA tenderness.   Musculoskeletal:      Right lower leg: No edema.      Left lower leg: No edema.   Skin:     General: Skin is warm.   Neurological:      Mental Status: She is alert and oriented to person, place, and time.      Motor: Weakness present.   Psychiatric:         Mood and Affect: Mood is anxious.         Speech: Speech normal.         Behavior: Behavior normal. Behavior is cooperative.         Cognition and Memory: Cognition and memory normal.       BMP:   Recent Labs   Lab 08/22/23  0519   GLU 82      K 3.8      CO2 20*   BUN 3*   CREATININE 0.7   CALCIUM 8.7     CBC:   Recent Labs   Lab 08/20/23  0947 08/21/23  0834 08/22/23  0519   WBC 4.33 3.30* 3.53*   HGB 6.8* 7.3* 7.2*   HCT 24.7* 27.9* 27.3*   PLT 24* 96* 110*     CMP:   Recent Labs   Lab 08/22/23  0519      K 3.8      CO2 20*   GLU 82   BUN 3*   CREATININE 0.7   CALCIUM 8.7   PROT 7.3   ALBUMIN 3.2*   BILITOT 0.6   ALKPHOS 98   AST 40   ALT 37   ANIONGAP 8         Imaging Results              US Pelvis Complete Non OB (Final result)  Result time 08/19/23 20:11:50   Procedure changed from US " Pelvis Comp with Transvag NON-OB (xpd     Final result by Neil Rendon MD (08/19/23 20:11:50)                   Impression:      Nonvisualization of bilateral ovaries.  The uterus was unremarkable.      Electronically signed by: Neil Rendon  Date:    08/19/2023  Time:    20:11               Narrative:    EXAMINATION:  US PELVIS COMPLETE NON OB    CLINICAL HISTORY:  anemia;    TECHNIQUE:  Pelvic ultrasound    COMPARISON:  None    FINDINGS:  The uterus measures 9.5 x 5.8 x 6.3 cm.  Endometrial stripe measures 7.3 mm.  The right ovary was not visualized.  The left ovary was not visualized.  Trace amount of free fluid in the dependent portion of pelvis.                                       CT Abdomen Pelvis With Contrast (Final result)  Result time 08/19/23 18:52:07      Final result by Neil Rendon MD (08/19/23 18:52:07)                   Impression:      Fatty infiltration of liver.    Splenomegaly    Right-sided renal pelviectasis    Nonvisualization of the appendix    All CT scans at this facility use dose modulation, iterative reconstruction, and/or weight based dosing when appropriate to reduce radiation dose to as low as reasonably achievable.      Electronically signed by: eNil Rendon  Date:    08/19/2023  Time:    18:52               Narrative:    EXAMINATION:  CT ABDOMEN PELVIS WITH CONTRAST    CLINICAL HISTORY:  Abdominal pain, acute, nonlocalized;    TECHNIQUE:  Low dose axial images, sagittal and coronal reformations were obtained from the lung bases to the pubic symphysis following the IV administration of 100 mL of Omnipaque 350.    COMPARISON:  None    FINDINGS:  Heart: Normal size as far as seen. No effusion as far as seen.    Lung Bases: Clear.    Liver: Fatty infiltration of liver.  No focal lesions.    Gallbladder: No calcified gallstones.    Bile Ducts: No dilatation.    Pancreas: No mass. No peripancreatic fat stranding.    Spleen: Splenomegaly.    Adrenals: Normal.    Kidneys/Ureters:  Right-sided pelviectasis.  The course and contour of the ureter is not well evaluated.    Bladder: No wall thickening.    Reproductive organs: Normal.    GI Tract/Mesentery: No evidence of bowel obstruction or inflammation.  No secondary evidence of acute appendicitis.  The appendix is not identified.    Peritoneal Space: No ascites or free air.    Retroperitoneum: No significant adenopathy.    Abdominal wall: Normal.    Vasculature: No aneurysm.    Bones: No acute fracture. No suspicious lytic or sclerotic lesions.                                       CT Abdomen Pelvis  Without Contrast (Final result)  Result time 08/19/23 15:28:39      Final result by Mirela Greenfield MD (08/19/23 15:28:39)                   Impression:      Anemia, splenomegaly, fullness of the upper right renal collecting system and jejuno jejunal intussusception without proximal obstructive findings      Electronically signed by: Mirela Greenfield  Date:    08/19/2023  Time:    15:28               Narrative:    EXAMINATION:  CT ABDOMEN PELVIS WITHOUT CONTRAST    CLINICAL HISTORY:  Abdominal abscess/infection suspected;    TECHNIQUE:  Low dose axial images, sagittal and coronal reformations were obtained from the lung bases to the pubic symphysis.  Contrast was not administered.    COMPARISON:  None    FINDINGS:  Imaging degraded by streak artifact related to extraneous removable lines.  Liver grossly normal size    Spleen enlarged    Gallbladder present    Pancreas without overt pathology.    Borderline right hydronephrosis as visualized with artifact.  Left kidney without hydronephrosis.    Urinary bladder moderately distended.    No overt obstructive bowel findings.  Jejuno jejunal intussusception.  Mid to distal small bowel contains fluid without significant distension.  Proximal colon contains fluid without distension.  Rectal stool present without distension.    Scant ascites    Anemia suggested                                        X-Ray Chest AP Portable (Final result)  Result time 08/19/23 12:48:09      Final result by Scout Pastrana III, MD (08/19/23 12:48:09)                   Impression:      No acute abnormality.      Electronically signed by: El Pastrana  Date:    08/19/2023  Time:    12:48               Narrative:    EXAMINATION:  XR CHEST AP PORTABLE    CLINICAL HISTORY:  Sepsis;.    TECHNIQUE:  Single frontal portable view of the chest was performed.    COMPARISON:  None    FINDINGS:  Support devices: None    The lungs are clear, with normal appearance of pulmonary vasculature and no pleural effusion or pneumothorax.    The cardiac silhouette is normal in size. The hilar and mediastinal contours are unremarkable.    Bones are intact.                                      Assessment/Plan:      * Anemia  Thrombocytopenia  Transfuse blood  hb/hct responded   Menorrhagia   Gyn consulted and recommended provera during menstrual cycle to control bleeding  Hem consulted and recommended iron supplementation  Patient received iron  Will defer additional iron sources until repeat blood cultures and intravenous rocephin     08/21/2023  -platelets 24k on AM CBC  -Heme/onc following, suspect undiagnosed thalassemia  -continue IV iron per recs  -trend CBC daily     08/22/2023  -thrombocytopenia rapidly improving  -110k this morning  -plan as above  -follow up hematology recs/clearance     E coli bacteremia  Source control  Continue intravenous rocephin  Repeat blood cultures needed     08/21/2023  -Growth in 1 of 2 bottles only  -continue Rocephin  -repeat cultures ordered this AM to test for resolution    08/22/2023  -Susceptible to current therapy, continue for 10-14 days  -repeat cultures Ngx24 hours     Menorrhagia with regular cycle  Gyn consulted  Provera recommended and prescribed      08/21/2023   No bleeding at this encounter  Plan as above  Trend CBC daily while hospitalized  Follow up with Dr. Bobby in outpatient  setting    08/22/2023  H&H stable on serial lab draws, but still symptomatic (weakness)        SOB (shortness of breath)  Breathing treatment prn  Chest x-ray prn         Hypokalemia  Mg and k repleted           Hyponatremia  Patient has hyponatremia which is controlled,We will aim to correct the sodium by 4-6mEq in 24 hours. We will monitor sodium Every 12 hours. The hyponatremia is due to Dehydration/hypovolemia. We will obtain the following studies: TSH, T4. We will treat the hyponatremia with IV fluids as follows: normal saline. The patient's sodium results have been reviewed and are listed below.        Recent Labs   Lab 08/19/23  1610   *      Improving      Exposure to COVID-19 virus  covid negative but symptomatic with fever, dyspnea, myalgias  Symptoms started on Tuesday   Remains febrile  covid precautions clinically indicated  Consider repeating covid test and discontinuing precautions if negative   Supportive measures     Sepsis  This patient does have evidence of infective focus  My overall impression is sepsis.  Source: Unknown  Antibiotics given-                                        Antibiotics (72h ago, onward)     Start     Stop Route Frequency Ordered     08/20/23 1530   cefTRIAXone (ROCEPHIN) 2 g in dextrose 5 % in water (D5W) 100 mL IVPB (MB+)         -- IV Every 24 hours (non-standard times) 08/20/23 1418            Latest lactate reviewed-        Recent Labs   Lab 08/19/23  1354   LACTATE 1.2      Organ dysfunction indicated by Thrombocytopenia      Fluid challenge Not needed - patient is not hypotensive       Post- resuscitation assessment Yes Perfusion exam was performed within 6 hours of septic shock presentation after bolus shows Adequate tissue perfusion assessed by non-invasive monitoring         Will Not start Pressors- Levophed for MAP of 65  Source control achieved by: intravenous rocephin  Based on tachycardia, tachypnea, fever, thrombocytopenia  Sofa 3  Received rocephin in  emergency department  Source control  Continue intravenous rocephin  Will need repeat blood cultures              VTE Risk Mitigation (From admission, onward)     None             Discharge Planning   ROLAND:      Code Status: Full Code   Is the patient medically ready for discharge?:     Reason for patient still in hospital (select all that apply): Patient new problem, Patient trending condition, Laboratory test, Treatment and Consult recommendations     Dispo: trend cultures, if NG by tomorrow cleared for discharge home         Ravindra Haynes MD  Department of Hospital Medicine   [facility])

## 2023-08-22 NOTE — PLAN OF CARE
Problem: Adult Inpatient Plan of Care  Goal: Plan of Care Review  Outcome: Ongoing, Progressing  Goal: Patient-Specific Goal (Individualized)  Outcome: Ongoing, Progressing  Goal: Absence of Hospital-Acquired Illness or Injury  Outcome: Ongoing, Progressing  Goal: Optimal Comfort and Wellbeing  Outcome: Ongoing, Progressing  Goal: Readiness for Transition of Care  Outcome: Ongoing, Progressing     Problem: Fatigue  Goal: Improved Activity Tolerance  Outcome: Ongoing, Progressing     Problem: Infection  Goal: Absence of Infection Signs and Symptoms  Outcome: Ongoing, Progressing     Problem: Adjustment to Illness (Sepsis/Septic Shock)  Goal: Optimal Coping  Outcome: Ongoing, Progressing     Problem: Bleeding (Sepsis/Septic Shock)  Goal: Absence of Bleeding  Outcome: Ongoing, Progressing     Problem: Glycemic Control Impaired (Sepsis/Septic Shock)  Goal: Blood Glucose Level Within Desired Range  Outcome: Ongoing, Progressing     Problem: Infection Progression (Sepsis/Septic Shock)  Goal: Absence of Infection Signs and Symptoms  Outcome: Ongoing, Progressing     Problem: Nutrition Impaired (Sepsis/Septic Shock)  Goal: Optimal Nutrition Intake  Outcome: Ongoing, Progressing     Problem: Adult Inpatient Plan of Care  Goal: Plan of Care Review  Outcome: Ongoing, Progressing  Goal: Patient-Specific Goal (Individualized)  Outcome: Ongoing, Progressing  Goal: Absence of Hospital-Acquired Illness or Injury  Outcome: Ongoing, Progressing  Goal: Optimal Comfort and Wellbeing  Outcome: Ongoing, Progressing  Goal: Readiness for Transition of Care  Outcome: Ongoing, Progressing     Problem: Fatigue  Goal: Improved Activity Tolerance  Outcome: Ongoing, Progressing     Problem: Infection  Goal: Absence of Infection Signs and Symptoms  Outcome: Ongoing, Progressing     Problem: Adjustment to Illness (Sepsis/Septic Shock)  Goal: Optimal Coping  Outcome: Ongoing, Progressing     Problem: Bleeding (Sepsis/Septic Shock)  Goal: Absence  of Bleeding  Outcome: Ongoing, Progressing     Problem: Glycemic Control Impaired (Sepsis/Septic Shock)  Goal: Blood Glucose Level Within Desired Range  Outcome: Ongoing, Progressing     Problem: Infection Progression (Sepsis/Septic Shock)  Goal: Absence of Infection Signs and Symptoms  Outcome: Ongoing, Progressing     Problem: Nutrition Impaired (Sepsis/Septic Shock)  Goal: Optimal Nutrition Intake  Outcome: Ongoing, Progressing

## 2023-08-22 NOTE — PROGRESS NOTES
'[place] - Telemetry ([facility])  Hematology/Oncology  Progress Note    Patient Name: Bneny Lorenzo  Admission Date: 8/19/2023  Hospital Length of Stay: 2 days  Code Status: Full Code     Subjective:     HPI:  38-year-old female found to be pants cytopenic with low hemoglobin as well as platelet count.  Rule out COVID-19 COVID-19 patient is found to be anemic as well as thrombocytopenic we were asked to see the patient for further evaluation unable to see patient physically because of COVID-19 precautions      Interval History:  Patient's platelet count has increased    Oncology Treatment Plan:   [No matching plan found]    Medications:  Continuous Infusions:  Scheduled Meds:   cefTRIAXone (ROCEPHIN) IVPB  2 g Intravenous Q24H     PRN Meds:0.9%  NaCl infusion (for blood administration), 0.9%  NaCl infusion (for blood administration), acetaminophen, sodium chloride 0.9%     Review of Systems   Unable to perform ROS: Other     Objective:     Vital Signs (Most Recent):  Temp: 98.3 °F (36.8 °C) (08/22/23 0708)  Pulse: 61 (08/22/23 0708)  Resp: 17 (08/22/23 0708)  BP: 97/62 (08/22/23 0708)  SpO2: 99 % (08/22/23 0708) Vital Signs (24h Range):  Temp:  [97.4 °F (36.3 °C)-98.4 °F (36.9 °C)] 98.3 °F (36.8 °C)  Pulse:  [61-83] 61  Resp:  [16-18] 17  SpO2:  [98 %-100 %] 99 %  BP: ()/(58-73) 97/62     Weight: 57.3 kg (126 lb 5.2 oz)  Body mass index is 21.02 kg/m².  Body surface area is 1.62 meters squared.    No intake or output data in the 24 hours ending 08/22/23 0738     Physical Exam  Vitals reviewed.          Significant Labs:   BMP:   Recent Labs   Lab 08/22/23 0519   GLU 82      K 3.8      CO2 20*   BUN 3*   CREATININE 0.7   CALCIUM 8.7   , CBC:   Recent Labs   Lab 08/20/23  0947 08/21/23  0834 08/22/23 0519   WBC 4.33 3.30* 3.53*   HGB 6.8* 7.3* 7.2*   HCT 24.7* 27.9* 27.3*   PLT 24* 96* 110*   , CMP:   Recent Labs   Lab 08/22/23  0519      K 3.8      CO2 20*   GLU 82   BUN 3*  "  CREATININE 0.7   CALCIUM 8.7   PROT 7.3   ALBUMIN 3.2*   BILITOT 0.6   ALKPHOS 98   AST 40   ALT 37   ANIONGAP 8   , Coagulation: No results for input(s): "PT", "INR", "APTT" in the last 48 hours., Haptoglobin: No results for input(s): "HAPTOGLOBIN" in the last 48 hours., Immunology: No results for input(s): "SPEP", "ELVA", "DONELL", "FREELAMBDALI" in the last 48 hours., LDH: No results for input(s): "LDHCSF", "BFSOURCE" in the last 48 hours., LFTs:   Recent Labs   Lab 08/22/23  0519   ALT 37   AST 40   ALKPHOS 98   BILITOT 0.6   PROT 7.3   ALBUMIN 3.2*   , Reticulocytes: No results for input(s): "RETIC" in the last 48 hours., Tumor Markers: No results for input(s): "PSA", "CEA", "", "AFPTM", "HH5959", "" in the last 48 hours.    Invalid input(s): "ALGTM", Uric Acid No results for input(s): "URICACID" in the last 48 hours., and Urine Studies: No results for input(s): "COLORU", "APPEARANCEUA", "PHUR", "SPECGRAV", "PROTEINUA", "GLUCUA", "KETONESU", "BILIRUBINUA", "OCCULTUA", "NITRITE", "UROBILINOGEN", "LEUKOCYTESUR", "RBCUA", "WBCUA", "BACTERIA", "SQUAMEPITHEL", "HYALINECASTS" in the last 48 hours.    Invalid input(s): "WRIGHTSUR"    Diagnostic Results:  I have reviewed all pertinent imaging results/findings within the past 24 hours.    Assessment/Plan:     * Anemia  Patient has documented iron deficiency also MCV is quite low.  This high likelihood patient has thalassemia as well but will would please intravenous iron to see where her platelet count stabilizes at but may be related to hypersplenism do not feel dexamethasone is needed at this particular point unless for inflammatory response    Thrombocytopenia  Thrombocytopenia may be related to hypersplenism.  With splenomegaly and fatty infiltration of liver.  Unusual to have ITP with splenomegaly.  Would recommend blue top tube for confirmation.  Patient also is severely iron deficient.  Would recommend repletion of IV iron occasionally you see " thrombocytopenia with severe iron deficiency as this patient is agree with Venofer would probably escalate dose to 200 mg 1 time dose in hospital and follow CBC if no bleeding do not need to intervene at this point  08/21/2023.  Results of blue top tube [place] [address].  Continue to monitor CBC on daily basis.  At this point iron repletion most efficient course of action  08/23/2023 dramatic increase in patient's platelet count.  After dose of iron.  Will continue to observe.  Will need additional intravenous iron in office if patient remains in hospital Venofer 200 mg on a weekly basis will be beneficial    SOB (shortness of breath)  Patient is anemic agree with intravenous iron at present time    Sepsis  Cared for by primary care team        Thank you for your consult. I will follow-up with patient. Please contact us if you have any additional questions.     Eder Centeno MD  Hematology/Oncology  [place] - Telemetry ([facility])

## 2023-08-22 NOTE — SUBJECTIVE & OBJECTIVE
"Interval History:  Patient's platelet count has increased    Oncology Treatment Plan:   [No matching plan found]    Medications:  Continuous Infusions:  Scheduled Meds:   cefTRIAXone (ROCEPHIN) IVPB  2 g Intravenous Q24H     PRN Meds:0.9%  NaCl infusion (for blood administration), 0.9%  NaCl infusion (for blood administration), acetaminophen, sodium chloride 0.9%     Review of Systems   Unable to perform ROS: Other     Objective:     Vital Signs (Most Recent):  Temp: 98.3 °F (36.8 °C) (08/22/23 0708)  Pulse: 61 (08/22/23 0708)  Resp: 17 (08/22/23 0708)  BP: 97/62 (08/22/23 0708)  SpO2: 99 % (08/22/23 0708) Vital Signs (24h Range):  Temp:  [97.4 °F (36.3 °C)-98.4 °F (36.9 °C)] 98.3 °F (36.8 °C)  Pulse:  [61-83] 61  Resp:  [16-18] 17  SpO2:  [98 %-100 %] 99 %  BP: ()/(58-73) 97/62     Weight: 57.3 kg (126 lb 5.2 oz)  Body mass index is 21.02 kg/m².  Body surface area is 1.62 meters squared.    No intake or output data in the 24 hours ending 08/22/23 0738     Physical Exam  Vitals reviewed.          Significant Labs:   BMP:   Recent Labs   Lab 08/22/23 0519   GLU 82      K 3.8      CO2 20*   BUN 3*   CREATININE 0.7   CALCIUM 8.7   , CBC:   Recent Labs   Lab 08/20/23  0947 08/21/23  0834 08/22/23 0519   WBC 4.33 3.30* 3.53*   HGB 6.8* 7.3* 7.2*   HCT 24.7* 27.9* 27.3*   PLT 24* 96* 110*   , CMP:   Recent Labs   Lab 08/22/23 0519      K 3.8      CO2 20*   GLU 82   BUN 3*   CREATININE 0.7   CALCIUM 8.7   PROT 7.3   ALBUMIN 3.2*   BILITOT 0.6   ALKPHOS 98   AST 40   ALT 37   ANIONGAP 8   , Coagulation: No results for input(s): "PT", "INR", "APTT" in the last 48 hours., Haptoglobin: No results for input(s): "HAPTOGLOBIN" in the last 48 hours., Immunology: No results for input(s): "SPEP", "ELVA", "DONELL", "FREELAMBDALI" in the last 48 hours., LDH: No results for input(s): "LDHCSF", "BFSOURCE" in the last 48 hours., LFTs:   Recent Labs   Lab 08/22/23  0519   ALT 37   AST 40   ALKPHOS 98   BILITOT " "0.6   PROT 7.3   ALBUMIN 3.2*   , Reticulocytes: No results for input(s): "RETIC" in the last 48 hours., Tumor Markers: No results for input(s): "PSA", "CEA", "", "AFPTM", "HE1369", "" in the last 48 hours.    Invalid input(s): "ALGTM", Uric Acid No results for input(s): "URICACID" in the last 48 hours., and Urine Studies: No results for input(s): "COLORU", "APPEARANCEUA", "PHUR", "SPECGRAV", "PROTEINUA", "GLUCUA", "KETONESU", "BILIRUBINUA", "OCCULTUA", "NITRITE", "UROBILINOGEN", "LEUKOCYTESUR", "RBCUA", "WBCUA", "BACTERIA", "SQUAMEPITHEL", "HYALINECASTS" in the last 48 hours.    Invalid input(s): "WRIGHTSUR"    Diagnostic Results:  I have reviewed all pertinent imaging results/findings within the past 24 hours.  "

## 2023-08-23 VITALS
OXYGEN SATURATION: 100 % | DIASTOLIC BLOOD PRESSURE: 72 MMHG | WEIGHT: 126.31 LBS | HEART RATE: 76 BPM | SYSTOLIC BLOOD PRESSURE: 117 MMHG | TEMPERATURE: 98 F | HEIGHT: 65 IN | RESPIRATION RATE: 18 BRPM | BODY MASS INDEX: 21.04 KG/M2

## 2023-08-23 LAB
ACANTHOCYTES BLD QL SMEAR: PRESENT
ALBUMIN SERPL BCP-MCNC: 3.3 G/DL (ref 3.5–5.2)
ALP SERPL-CCNC: 97 U/L (ref 55–135)
ALT SERPL W/O P-5'-P-CCNC: 36 U/L (ref 10–44)
ANION GAP SERPL CALC-SCNC: 9 MMOL/L (ref 8–16)
ANISOCYTOSIS BLD QL SMEAR: ABNORMAL
AST SERPL-CCNC: 30 U/L (ref 10–40)
BASOPHILS NFR BLD: 0 % (ref 0–1.9)
BILIRUB SERPL-MCNC: 0.5 MG/DL (ref 0.1–1)
BUN SERPL-MCNC: 3 MG/DL (ref 6–20)
CALCIUM SERPL-MCNC: 9.5 MG/DL (ref 8.7–10.5)
CHLORIDE SERPL-SCNC: 111 MMOL/L (ref 95–110)
CO2 SERPL-SCNC: 20 MMOL/L (ref 23–29)
CREAT SERPL-MCNC: 0.7 MG/DL (ref 0.5–1.4)
DACRYOCYTES BLD QL SMEAR: ABNORMAL
DIFFERENTIAL METHOD: ABNORMAL
EOSINOPHIL NFR BLD: 4 % (ref 0–8)
ERYTHROCYTE [DISTWIDTH] IN BLOOD BY AUTOMATED COUNT: 34.5 % (ref 11.5–14.5)
EST. GFR  (NO RACE VARIABLE): >60 ML/MIN/1.73 M^2
GLUCOSE SERPL-MCNC: 84 MG/DL (ref 70–110)
HCT VFR BLD AUTO: 30.4 % (ref 37–48.5)
HGB BLD-MCNC: 7.9 G/DL (ref 12–16)
HYPOCHROMIA BLD QL SMEAR: ABNORMAL
IMM GRANULOCYTES # BLD AUTO: ABNORMAL K/UL (ref 0–0.04)
IMM GRANULOCYTES NFR BLD AUTO: ABNORMAL % (ref 0–0.5)
LYMPHOCYTES NFR BLD: 46 % (ref 18–48)
MCH RBC QN AUTO: 18.2 PG (ref 27–31)
MCHC RBC AUTO-ENTMCNC: 26 G/DL (ref 32–36)
MCV RBC AUTO: 70 FL (ref 82–98)
METAMYELOCYTES NFR BLD MANUAL: 4 %
MONOCYTES NFR BLD: 4 % (ref 4–15)
NEUTROPHILS NFR BLD: 37 % (ref 38–73)
NEUTS BAND NFR BLD MANUAL: 5 %
NRBC BLD-RTO: 0 /100 WBC
OVALOCYTES BLD QL SMEAR: ABNORMAL
PLATELET # BLD AUTO: 234 K/UL (ref 150–450)
PLATELET BLD QL SMEAR: ABNORMAL
PMV BLD AUTO: ABNORMAL FL (ref 9.2–12.9)
POIKILOCYTOSIS BLD QL SMEAR: SLIGHT
POLYCHROMASIA BLD QL SMEAR: ABNORMAL
POTASSIUM SERPL-SCNC: 4 MMOL/L (ref 3.5–5.1)
PROT SERPL-MCNC: 7.3 G/DL (ref 6–8.4)
RBC # BLD AUTO: 4.35 M/UL (ref 4–5.4)
SCHISTOCYTES BLD QL SMEAR: ABNORMAL
SCHISTOCYTES BLD QL SMEAR: PRESENT
SODIUM SERPL-SCNC: 140 MMOL/L (ref 136–145)
WBC # BLD AUTO: 5.43 K/UL (ref 3.9–12.7)

## 2023-08-23 PROCEDURE — 85007 BL SMEAR W/DIFF WBC COUNT: CPT | Performed by: STUDENT IN AN ORGANIZED HEALTH CARE EDUCATION/TRAINING PROGRAM

## 2023-08-23 PROCEDURE — 85027 COMPLETE CBC AUTOMATED: CPT | Performed by: STUDENT IN AN ORGANIZED HEALTH CARE EDUCATION/TRAINING PROGRAM

## 2023-08-23 PROCEDURE — 36415 COLL VENOUS BLD VENIPUNCTURE: CPT | Performed by: STUDENT IN AN ORGANIZED HEALTH CARE EDUCATION/TRAINING PROGRAM

## 2023-08-23 PROCEDURE — 25000003 PHARM REV CODE 250: Performed by: NURSE PRACTITIONER

## 2023-08-23 PROCEDURE — 80053 COMPREHEN METABOLIC PANEL: CPT | Performed by: STUDENT IN AN ORGANIZED HEALTH CARE EDUCATION/TRAINING PROGRAM

## 2023-08-23 RX ORDER — HYDROCODONE BITARTRATE AND ACETAMINOPHEN 5; 325 MG/1; MG/1
1 TABLET ORAL EVERY 8 HOURS PRN
Qty: 21 TABLET | Refills: 0 | Status: SHIPPED | OUTPATIENT
Start: 2023-08-23

## 2023-08-23 RX ORDER — CEFDINIR 300 MG/1
300 CAPSULE ORAL 2 TIMES DAILY
Qty: 20 CAPSULE | Refills: 0 | Status: SHIPPED | OUTPATIENT
Start: 2023-08-23 | End: 2023-09-02

## 2023-08-23 RX ORDER — METHOCARBAMOL 500 MG/1
500 TABLET, FILM COATED ORAL 4 TIMES DAILY
Qty: 40 TABLET | Refills: 0 | Status: SHIPPED | OUTPATIENT
Start: 2023-08-23 | End: 2023-09-02

## 2023-08-23 RX ADMIN — HYDROCODONE BITARTRATE AND ACETAMINOPHEN 1 TABLET: 5; 325 TABLET ORAL at 06:08

## 2023-08-23 NOTE — NURSING
Discharge instructions received and reviewed with pt and family at bedside.  Pt voiced understanding and all questions answered to satisfaction.  Stressed importance to making and keeping all follow up appointments.  Medications sent to pt pharmacy and reviewed with pt.  Tele monitor removed and brought to monitor tech.  IV d/c'd with tip intact, pressure dressing applied.  Pt awaiting ride arrival and med delivery.

## 2023-08-23 NOTE — NURSING
Received in bed awake and alert watching tv, assessment per flowsheet, c/o mid back pain. Plan of care discussed. Pt verbalized understanding. Bed low, call light within reach, bed alarm refused. Will continue to monitor.

## 2023-08-23 NOTE — PLAN OF CARE
[facility] - Telemetry (Hospital)  Discharge Final Note    Primary Care Provider: No, Primary Doctor    Expected Discharge Date: 8/23/2023    Final Discharge Note (most recent)       Final Note - 08/23/23 1212          Final Note    Assessment Type Final Discharge Note (P)      Anticipated Discharge Disposition Home or Self Care (P)      Hospital Resources/Appts/Education Provided Appointments scheduled and added to AVS (P)         Post-Acute Status    Discharge Delays None known at this time (P)                      Important Message from Medicare             Contact Info       No, Primary Doctor   Relationship: PCP - General        Next Steps: Schedule an appointment as soon as possible for a visit    Dana Bobby MD   Specialty: Obstetrics and Gynecology, Obstetrics    [address] [place] [address] [address] [place] [address]   Phone: [phone]       Next Steps: Schedule an appointment as soon as possible for a visit in 1 week(s)    Eder Centeno MD   Specialty: Hematology and Oncology    [address] [facility]  [place] [address]   Phone: [phone]       Next Steps: Schedule an appointment as soon as possible for a visit in 1 week(s)

## 2023-08-23 NOTE — DISCHARGE SUMMARY
[facility] Medicine  Discharge Summary      Patient Name: Benny Lorenzo  MRN: [id number]  KANCHAN: [id number]  Patient Class: IP- Inpatient  Admission Date: 8/19/2023  Hospital Length of Stay: 3 days  Discharge Date and Time:  08/23/2023 1:26 PM  Attending Physician: Debra att. providers found   Discharging Provider: Ravindra Haynes MD  Primary Care Provider: Debra, Primary Doctor    Primary Care Team: Networked reference to record PCT     HPI:   38F  has a past medical history of Anemia, unspecified, anxiety and depression. Presents to emergency department with fever, diaphoresis, myalgias, trouble breathing. Positive covid exposure on Tuesday. Associated with abdominal pain, sore throat, ear ache. Denies psh gastric bypass. Last bowel movement this am. Has been having 2 menstrual cycles per month. Does not take iron supplements. No longer taking anxiety and depression medication(s).    Initial workup in emergency department revealed fever, tachycardia, and tachypnea. Cbc with significant anemia hb/hct 6.2/23.5 and thrombocytopenia of 49k. Cmp with hyponatremia, hypokalemia, nonanion gap metabolic acidosis, hypomagnesemia. Lactate within normal limits. Influenza and covid negative. Urinalysis with trace leuks and occult blood, 1+. Preg test negative. Chest x-ray unremarkable. Ct abdomen pelvis without contrast with concerns for nonobsructing intussusception, splenomegaly. Us pelvis pending.    Hospital medicine consulted for admission under observation.  Symptomatic anemia, sirs, intussusception       * No surgery found *      Hospital Course:   The patient was admitted on 8/20 following fever, potential exposure to COVID-19, and hypokalemia. Upon evaluation, patient was found to have a profound thrombocytopenia, anemia, E.coli bacteremia. Intravenous Rocephin was initiated. The patient's iron supplementation was halted and they were transfused with one unit each of PRBCs and platelets.  Vaginal bleeding subsided early on in hospitalization. Consultations with Hematology and Gynecology were completed. Provera sent to OP pharmacy and patient instructed to follow up OP.     By 8/21, Cultures were taken to monitor the resolution of the infection. On 8/22, the patient remained stable and asymptomatic, with initial cultures confirming the sensitivity of the bacteremia to 3rd generation cephalosporins, thus continuing the Rocephin treatment. On 8/23, repeated cultures were negative x 48 hours and the patient, being asymptomatic, was prepared for discharge.    Regarding the patient's anemia, they were noted to have iron deficiency with a significantly reduced MCV. There's a high possibility of concurrent thalassemia. While IV iron is recommended for the iron deficiency, the potential impact of hypersplenism on the platelet count is also being considered. Dexamethasone isn't deemed necessary at this juncture.    Hematology highlighted the patient's thrombocytopenia, which might be linked to hypersplenism, given the evidence of splenomegaly and fatty liver infiltration. Immune thrombocytopenic purpura (ITP) was deemed unlikely due to the splenomegaly. A blue top tube was recommended for further confirmation, alongside IV iron repletion. On 08/21, the blue top tube revealed a platelet count of 45,000, and it was advised to continually monitor the CBC. As of 08/23, the patient exhibited a significant rise in platelet count after receiving iron. Monitoring is ongoing, with recommendations for further IV iron treatment, specifically Venofer at 200 mg weekly in outpatient clinic. Follow-up through the hematology clinic was organized prior to discharge. Antibiotics changed to PO Omnicef and patient given 10-day prescription to complete 14 day total therapy. Patient acknowledged understanding and agreed to the plan.     /72 (BP Location: Right arm, Patient Position: Sitting)   Pulse 76   Temp 98.4 °F (36.9  "°C) (Oral)   Resp 18   Ht 5' 5" (1.651 m)   Wt 57.3 kg (126 lb 5.2 oz)   LMP 08/22/2023 (Exact Date)   SpO2 100%   Breastfeeding No   BMI 21.02 kg/m² '  PHYSICAL EXAM  Vitals Reviewed  GEN: No acute distress, pleasant, body habitus normal  HEENT: atraumatic and normocephalic  CARDS: regular rate and rhythm, no m/g, pulses palpable in LE  PULM: breathing comfortably on room air, chest symmetric, nonlabored, no abnormal breath sounds on auscultation  ABD: nontender, nondistended, soft, no organomegaly, BS+  Neuro: Alert and oriented x3, CN's I-IX grossly intact, sensation and motor intact; follows directions and answers questions appropriately         Goals of Care Treatment Preferences:  Code Status: Full Code      Consults:   Consults (From admission, onward)        Status Ordering Provider     Inpatient consult to Hematology/Oncology  Once        Provider:  Eder Centeno MD    Acknowledged ADWOA BARLOW     Inpatient consult to General Surgery  Once        Provider:  Ron Hui MD    Completed ADWOA BARLOW     Inpatient consult to Obstetrics / Gynecology  Once        Provider:  Dana Bobby MD    Acknowledged TOR LOZANO JR          No new Assessment & Plan notes have been filed under this hospital service since the last note was generated.  Service: Hospital Medicine    Final Active Diagnoses:    Diagnosis Date Noted POA    PRINCIPAL PROBLEM:  Anemia [D64.9] 08/19/2023 Yes    E coli bacteremia [R78.81, B96.20] 08/20/2023 Yes    Sepsis [A41.9] 08/19/2023 Yes    Exposure to COVID-19 virus [Z20.822] 08/19/2023 Yes    Hyponatremia [E87.1] 08/19/2023 Yes    Hypokalemia [E87.6] 08/19/2023 Yes    SOB (shortness of breath) [R06.02] 08/19/2023 Yes    Menorrhagia with regular cycle [N92.0] 08/19/2023 Yes    Thrombocytopenia [D69.6] 08/19/2023 Yes      Problems Resolved During this Admission:       Discharged Condition: good    Disposition: Home or Self Care    Follow Up:   Follow-up " "Information     No, Primary Doctor. Schedule an appointment as soon as possible for a visit.           Dana Bobby MD. Schedule an appointment as soon as possible for a visit in 1 week(s).    Specialties: Obstetrics and Gynecology, Obstetrics  Contact information:  [id number] [place] [address] [address] [place] [address]  [phone]             Eder Centeno MD. Schedule an appointment as soon as possible for a visit in 1 week(s).    Specialty: Hematology and Oncology  Contact information:  [address]  [place] [address]  [phone]                       Patient Instructions:      CT Abdomen Pelvis With Contrast   Standing Status: Future Standing Exp. Date: 08/19/24     Order Specific Question Answer Comments   Is the patient pregnant? No    Is the patient allergic to iodine contrast? No    Does this patient have impaired renal function? No    May the Radiologist modify the order per protocol to meet the clinical needs of the patient? Yes    Oral/Rectal Contrast instructions: NO Oral Contrast      Ambulatory referral/consult to Family Practice   Standing Status: Future   Referral Priority: Routine Referral Type: Consultation   Referral Reason: Specialty Services Required   Requested Specialty: Family Medicine   Number of Visits Requested: 1       Significant Diagnostic Studies:   BMP: Recent Labs   Lab 08/23/23  0530   GLU 84      K 4.0   *   CO2 20*   BUN 3*   CREATININE 0.7   CALCIUM 9.5     CBC:   Recent Labs   Lab 08/22/23 0519 08/23/23  0530   WBC 3.53* 5.43   HGB 7.2* 7.9*   HCT 27.3* 30.4*   * 234     CMP:   Recent Labs   Lab 08/22/23 0519 08/23/23  0530    140   K 3.8 4.0    111*   CO2 20* 20*   GLU 82 84   BUN 3* 3*   CREATININE 0.7 0.7   CALCIUM 8.7 9.5   PROT 7.3 7.3   ALBUMIN 3.2* 3.3*   BILITOT 0.6 0.5   ALKPHOS 98 97   AST 40 30   ALT 37 36   ANIONGAP 8 9     Cardiac Markers: No results for input(s): "CKMB", "MYOGLOBIN", "BNP", "TROPISTAT" in the last 48 " "hours.  Coagulation: No results for input(s): "PT", "INR", "APTT" in the last 48 hours.  Lactic Acid: No results for input(s): "LACTATE" in the last 48 hours.  Magnesium: No results for input(s): "MG" in the last 48 hours.  Troponin: No results for input(s): "TROPONINI", "TROPONINIHS" in the last 48 hours.  TSH: No results for input(s): "TSH" in the last 4320 hours.  Urine Studies:   No results for input(s): "COLORU", "APPEARANCEUA", "PHUR", "SPECGRAV", "PROTEINUA", "GLUCUA", "KETONESU", "BILIRUBINUA", "OCCULTUA", "NITRITE", "UROBILINOGEN", "LEUKOCYTESUR", "RBCUA", "WBCUA", "BACTERIA", "SQUAMEPITHEL", "HYALINECASTS" in the last 48 hours.    Invalid input(s): "WRIGHTSUR"  Imaging Results          US Pelvis Complete Non OB (Final result)  Result time 08/19/23 20:11:50   Procedure changed from US Pelvis Comp with Transvag NON-OB (xpd     Final result by Neil Rendon MD (08/19/23 20:11:50)                 Impression:      Nonvisualization of bilateral ovaries.  The uterus was unremarkable.      Electronically signed by: Neil Rendon  Date:    08/19/2023  Time:    20:11             Narrative:    EXAMINATION:  US PELVIS COMPLETE NON OB    CLINICAL HISTORY:  anemia;    TECHNIQUE:  Pelvic ultrasound    COMPARISON:  None    FINDINGS:  The uterus measures 9.5 x 5.8 x 6.3 cm.  Endometrial stripe measures 7.3 mm.  The right ovary was not visualized.  The left ovary was not visualized.  Trace amount of free fluid in the dependent portion of pelvis.                               CT Abdomen Pelvis With Contrast (Final result)  Result time 08/19/23 18:52:07    Final result by Neil Rendon MD (08/19/23 18:52:07)                 Impression:      Fatty infiltration of liver.    Splenomegaly    Right-sided renal pelviectasis    Nonvisualization of the appendix    All CT scans at this facility use dose modulation, iterative reconstruction, and/or weight based dosing when appropriate to reduce radiation dose to as low as " reasonably achievable.      Electronically signed by: Neil Rendon  Date:    08/19/2023  Time:    18:52             Narrative:    EXAMINATION:  CT ABDOMEN PELVIS WITH CONTRAST    CLINICAL HISTORY:  Abdominal pain, acute, nonlocalized;    TECHNIQUE:  Low dose axial images, sagittal and coronal reformations were obtained from the lung bases to the pubic symphysis following the IV administration of 100 mL of Omnipaque 350.    COMPARISON:  None    FINDINGS:  Heart: Normal size as far as seen. No effusion as far as seen.    Lung Bases: Clear.    Liver: Fatty infiltration of liver.  No focal lesions.    Gallbladder: No calcified gallstones.    Bile Ducts: No dilatation.    Pancreas: No mass. No peripancreatic fat stranding.    Spleen: Splenomegaly.    Adrenals: Normal.    Kidneys/Ureters: Right-sided pelviectasis.  The course and contour of the ureter is not well evaluated.    Bladder: No wall thickening.    Reproductive organs: Normal.    GI Tract/Mesentery: No evidence of bowel obstruction or inflammation.  No secondary evidence of acute appendicitis.  The appendix is not identified.    Peritoneal Space: No ascites or free air.    Retroperitoneum: No significant adenopathy.    Abdominal wall: Normal.    Vasculature: No aneurysm.    Bones: No acute fracture. No suspicious lytic or sclerotic lesions.                               CT Abdomen Pelvis  Without Contrast (Final result)  Result time 08/19/23 15:28:39    Final result by Mirela Greenfield MD (08/19/23 15:28:39)                 Impression:      Anemia, splenomegaly, fullness of the upper right renal collecting system and jejuno jejunal intussusception without proximal obstructive findings      Electronically signed by: Mirela Greenfield  Date:    08/19/2023  Time:    15:28             Narrative:    EXAMINATION:  CT ABDOMEN PELVIS WITHOUT CONTRAST    CLINICAL HISTORY:  Abdominal abscess/infection suspected;    TECHNIQUE:  Low dose axial images, sagittal and  coronal reformations were obtained from the lung bases to the pubic symphysis.  Contrast was not administered.    COMPARISON:  None    FINDINGS:  Imaging degraded by streak artifact related to extraneous removable lines.  Liver grossly normal size    Spleen enlarged    Gallbladder present    Pancreas without overt pathology.    Borderline right hydronephrosis as visualized with artifact.  Left kidney without hydronephrosis.    Urinary bladder moderately distended.    No overt obstructive bowel findings.  Jejuno jejunal intussusception.  Mid to distal small bowel contains fluid without significant distension.  Proximal colon contains fluid without distension.  Rectal stool present without distension.    Scant ascites    Anemia suggested                               X-Ray Chest AP Portable (Final result)  Result time 08/19/23 12:48:09    Final result by Scout Pastrana III, MD (08/19/23 12:48:09)                 Impression:      No acute abnormality.      Electronically signed by: El Pastrana  Date:    08/19/2023  Time:    12:48             Narrative:    EXAMINATION:  XR CHEST AP PORTABLE    CLINICAL HISTORY:  Sepsis;.    TECHNIQUE:  Single frontal portable view of the chest was performed.    COMPARISON:  None    FINDINGS:  Support devices: None    The lungs are clear, with normal appearance of pulmonary vasculature and no pleural effusion or pneumothorax.    The cardiac silhouette is normal in size. The hilar and mediastinal contours are unremarkable.    Bones are intact.                                  Pending Diagnostic Studies:     None         Medications:  Reconciled Home Medications:      Medication List      START taking these medications    cefdinir 300 MG capsule  Commonly known as: OMNICEF  Take 1 capsule (300 mg total) by mouth 2 (two) times daily. for 10 days     HYDROcodone-acetaminophen 5-325 mg per tablet  Commonly known as: NORCO  Take 1 tablet by mouth every 8 (eight) hours as needed  for Pain.     medroxyPROGESTERone 10 MG tablet  Commonly known as: PROVERA  Take 1 tablet (10 mg total) by mouth once daily. for 14 days     methocarbamoL 500 MG Tab  Commonly known as: ROBAXIN  Take 1 tablet (500 mg total) by mouth 4 (four) times daily. for 10 days        CONTINUE taking these medications    EScitalopram oxalate 10 MG tablet  Commonly known as: LEXAPRO  Take 10 mg by mouth once daily.     ferrous sulfate 325 mg (65 mg iron) Tab tablet  Commonly known as: FEOSOL  Take 325 mg by mouth with breakfast.     QUEtiapine 50 MG tablet  Commonly known as: SEROQUEL  Take 50 mg by mouth every evening.            Indwelling Lines/Drains at time of discharge:   Lines/Drains/Airways     None                 Time spent on the discharge of patient: 35 minutes  of time spent on discharge including examining patient, providing discharge instructions, arranging follow-up and documentation.           Ravindra Haynes MD  Department of Hospital Medicine  [facility] - Telemetry ([facility])

## 2023-08-24 LAB — BACTERIA BLD CULT: NORMAL

## 2023-08-25 ENCOUNTER — NURSE TRIAGE (OUTPATIENT)
Dept: ADMINISTRATIVE | Facility: CLINIC | Age: 38
End: 2023-08-25

## 2023-08-25 ENCOUNTER — PATIENT OUTREACH (OUTPATIENT)
Dept: ADMINISTRATIVE | Facility: CLINIC | Age: 38
End: 2023-08-25

## 2023-08-25 NOTE — PROGRESS NOTES
Patient transferred to triage nurse Suzanna Erazo due to symptom below.  States she is having mid chest pain which is a new symptom currently rates pain a 6/10.

## 2023-08-25 NOTE — TELEPHONE ENCOUNTER
Patient states c/o chest pain and recent discharge from the hospital. Patient denies difficulty breathing and states her heart is beating irregularly.     Patient advised to Go To ED Now for evaluation/treatment and to have another adult drive her to ED. Patient also advised to have another adult drive her to ED. Patient states understanding of care advice.       Reason for Disposition   Heart beating irregularly or very rapidly    Additional Information   Negative: SEVERE difficulty breathing (e.g., struggling for each breath, speaks in single words)   Negative: Difficult to awaken or acting confused (e.g., disoriented, slurred speech)   Negative: Shock suspected (e.g., cold/pale/clammy skin, too weak to stand, low BP, rapid pulse)   Negative: Passed out (i.e., lost consciousness, collapsed and was not responding)   Negative: Chest pain lasting longer than 5 minutes and over 44 years old   Negative: Chest pain lasting longer than 5 minutes, over 30 years old, and at least one cardiac risk factor (e.g., diabetes mellitus, high blood pressure, high cholesterol, smoker, or strong family history of heart disease)   Negative: Chest pain lasting longer than 5 minutes and history of heart disease (i.e., angina, heart attack, heart failure, bypass surgery, takes nitroglycerin)   Negative: Chest pain lasting longer than 5 minutes and pain is crushing, pressure-like, or heavy   Negative: Heart beating < 50 beats per minute OR > 140 beats per minute   Negative: Visible sweat on face or sweat dripping down face   Negative: Sounds like a life-threatening emergency to the triager   Negative: Followed an injury to chest   Negative: SEVERE chest pain   Negative: Pain also in shoulder(s) or arm(s) or jaw   Negative: Difficulty breathing   Negative: Cocaine use within last 3 days   Negative: Major surgery in the past month   Negative: Hip or leg fracture (broken bone) in past month (or had cast on leg or ankle in past month)    Negative: Illness requiring prolonged bedrest in past month (e.g., immobilization, long hospital stay)   Negative: Long-distance travel in past month (e.g., car, bus, train, plane; with trip lasting 6 or more hours)   Negative: History of prior 'blood clot' in leg or lungs (i.e., deep vein thrombosis, pulmonary embolism)   Negative: History of inherited increased risk of blood clots (e.g., Factor 5 Leiden, Anti-thrombin 3, Protein C or Protein S deficiency, Prothrombin mutation)   Negative: Cancer treatment in the past two months (or has cancer now)    Protocols used: Chest Pain-A-[place]

## 2023-08-26 LAB — BACTERIA BLD CULT: NORMAL

## 2023-08-28 NOTE — PROGRESS NOTES
C3 nurse spoke with Benny Lorenzo  for a TCC post hospital discharge follow up call. The patient reports does not have a scheduled HOSFU appointment. C3 nurse was [facility] to [facility]. Patient advised to contact their PCP to schedule a HOSPFU within 5-7 days.

## 2023-10-28 ENCOUNTER — HOSPITAL ENCOUNTER (EMERGENCY)
Facility: HOSPITAL | Age: 38
Discharge: HOME | End: 2023-10-28
Attending: EMERGENCY MEDICINE
Payer: COMMERCIAL

## 2023-10-28 VITALS
HEIGHT: 65 IN | DIASTOLIC BLOOD PRESSURE: 72 MMHG | TEMPERATURE: 99 F | HEART RATE: 92 BPM | RESPIRATION RATE: 18 BRPM | BODY MASS INDEX: 21.79 KG/M2 | OXYGEN SATURATION: 100 % | WEIGHT: 130.81 LBS | SYSTOLIC BLOOD PRESSURE: 125 MMHG

## 2023-10-28 DIAGNOSIS — M25.512 CHRONIC LEFT SHOULDER PAIN: ICD-10-CM
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
## 2023-10-28 DIAGNOSIS — S43.102A SEPARATION OF LEFT ACROMIOCLAVICULAR JOINT, INITIAL ENCOUNTER: Primary | ICD-10-CM

## 2023-10-28 DIAGNOSIS — G89.29 CHRONIC LEFT SHOULDER PAIN: ICD-10-CM

## 2023-10-28 PROCEDURE — 63600175 PHARM REV CODE 636 W HCPCS: Performed by: NURSE PRACTITIONER

## 2023-10-28 PROCEDURE — 99284 EMERGENCY DEPT VISIT MOD MDM: CPT

## 2023-10-28 PROCEDURE — 96372 THER/PROPH/DIAG INJ SC/IM: CPT | Performed by: NURSE PRACTITIONER

## 2023-10-28 RX ORDER — CYCLOBENZAPRINE HCL 10 MG
10 TABLET ORAL 3 TIMES DAILY PRN
Qty: 15 TABLET | Refills: 0 | Status: SHIPPED | OUTPATIENT
Start: 2023-10-28 | End: 2023-11-02

## 2023-10-28 RX ORDER — ORPHENADRINE CITRATE 30 MG/ML
60 INJECTION INTRAMUSCULAR; INTRAVENOUS
Status: COMPLETED | OUTPATIENT
Start: 2023-10-28 | End: 2023-10-28

## 2023-10-28 RX ORDER — DICLOFENAC SODIUM 50 MG/1
50 TABLET, DELAYED RELEASE ORAL 3 TIMES DAILY PRN
Qty: 15 TABLET | Refills: 0 | Status: SHIPPED | OUTPATIENT
Start: 2023-10-28

## 2023-10-28 RX ADMIN — ORPHENADRINE CITRATE 60 MG: 60 INJECTION INTRAMUSCULAR; INTRAVENOUS at 12:10

## 2023-10-28 NOTE — Clinical Note
"Benny Vernonimtiaz Lorenzo was seen and treated in our emergency department on 10/27/2023.  She may return to work on 10/31/2023.       If you have any questions or concerns, please don't hesitate to call.       RN    "
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
## 2023-11-14 ENCOUNTER — TELEPHONE (OUTPATIENT)
Dept: ORTHOPEDICS | Facility: CLINIC | Age: 38
End: 2023-11-14
Payer: COMMERCIAL

## 2023-11-14 NOTE — TELEPHONE ENCOUNTER
Spoke with patient and scheduled their ER follow up visit. Patient verbalized understanding of appointment date, time and location.

## 2023-11-14 NOTE — TELEPHONE ENCOUNTER
----- Message from Kandis Bird MA sent at 11/14/2023 10:39 AM CST -----  Regarding: FW: [facility] referral  When should patient follow up in clinic?  ----- Message -----  From: Mora Vu LPN  Sent: 11/14/2023   9:39 AM CST  To: [facility]  Subject: [facility] referral                                 ED on 10/28/23 - for  Separation of left acromioclavicular joint    Please call to set up    Will cancel referral in the workque    Thank you

## 2023-11-20 DIAGNOSIS — M25.512 BILATERAL SHOULDER PAIN, UNSPECIFIED CHRONICITY: Primary | ICD-10-CM

## 2023-11-20 DIAGNOSIS — M25.511 BILATERAL SHOULDER PAIN, UNSPECIFIED CHRONICITY: Primary | ICD-10-CM

## 2023-11-27 PROBLEM — A41.9 SEPSIS: Status: RESOLVED | Noted: 2023-08-19 | Resolved: 2023-11-27

## 2024-02-05 ENCOUNTER — HOSPITAL ENCOUNTER (EMERGENCY)
Facility: HOSPITAL | Age: 39
Discharge: LEFT BEFORE DISCHARGE | End: 2024-02-05
Attending: EMERGENCY MEDICINE

## 2024-02-05 VITALS
BODY MASS INDEX: 21.63 KG/M2 | DIASTOLIC BLOOD PRESSURE: 66 MMHG | SYSTOLIC BLOOD PRESSURE: 135 MMHG | RESPIRATION RATE: 16 BRPM | WEIGHT: 129.94 LBS | OXYGEN SATURATION: 95 % | TEMPERATURE: 99 F | HEART RATE: 75 BPM

## 2024-02-05 DIAGNOSIS — M25.512 SHOULDER PAIN, LEFT: ICD-10-CM

## 2024-02-05 PROCEDURE — 99283 EMERGENCY DEPT VISIT LOW MDM: CPT | Mod: 25

## 2024-02-05 RX ORDER — KETOROLAC TROMETHAMINE 30 MG/ML
30 INJECTION, SOLUTION INTRAMUSCULAR; INTRAVENOUS
Status: DISCONTINUED | OUTPATIENT
Start: 2024-02-05 | End: 2024-02-06 | Stop reason: HOSPADM

## 2024-02-05 RX ORDER — ORPHENADRINE CITRATE 30 MG/ML
60 INJECTION INTRAMUSCULAR; INTRAVENOUS
Status: DISCONTINUED | OUTPATIENT
Start: 2024-02-05 | End: 2024-02-06 | Stop reason: HOSPADM

## 2024-02-07 NOTE — ED PROVIDER NOTES
HISTORY     Chief Complaint   Patient presents with    Shoulder Pain     Left shoulder pain. Endorses AC dislocation approx a year ago. No relief from OTC tylenol      Review of patient's allergies indicates:  No Known Allergies     HPI   38-year-old female presents to the emergency room with left shoulder pain.  Patient injured shoulder year ago and has intermittent pain.  Patient states she over use her shoulder cleaning in the pain has returned.  Patient denies any prior treatment.    The history is provided by the patient.        PCP: No, Primary Doctor     Past Medical History:  Past Medical History:   Diagnosis Date    Anemia, unspecified         Past Surgical History:  Past Surgical History:   Procedure Laterality Date     SECTION      x3    TONSILLECTOMY      TUBAL LIGATION Bilateral         Family History:  No family history on file.     Social History:  Social History     Tobacco Use    Smoking status: Never    Smokeless tobacco: Not on file   Substance and Sexual Activity    Alcohol use: Never    Drug use: Not on file    Sexual activity: Yes     Partners: Male     Birth control/protection: See Surgical Hx         ROS   Review of Systems   Constitutional:  Negative for fever.   HENT:  Negative for sore throat.    Respiratory:  Negative for shortness of breath.    Cardiovascular:  Negative for chest pain.   Gastrointestinal:  Negative for nausea.   Genitourinary:  Negative for dysuria.   Musculoskeletal:  Negative for back pain.        Left shoulder pain   Skin:  Negative for rash.   Neurological:  Negative for weakness.   Hematological:  Does not bruise/bleed easily.       PHYSICAL EXAM     Initial Vitals [24 1832]   BP Pulse Resp Temp SpO2   135/66 75 16 98.9 °F (37.2 °C) 95 %      MAP       --           Physical Exam    Constitutional: She appears well-developed and well-nourished. No distress.   HENT:   Head: Normocephalic and atraumatic.   Eyes: Conjunctivae are normal. Pupils are  equal, round, and reactive to light.   Neck: Neck supple.   Normal range of motion.  Cardiovascular:  Normal rate, regular rhythm and normal heart sounds.           Pulmonary/Chest: Breath sounds normal.   Abdominal: Abdomen is soft. Bowel sounds are normal. She exhibits no distension. There is no abdominal tenderness. There is no rebound.   Musculoskeletal:         General: No edema. Normal range of motion.      Cervical back: Normal range of motion and neck supple.     Neurological: She is alert and oriented to person, place, and time. She has normal strength.   Skin: Skin is warm and dry.   Psychiatric: She has a normal mood and affect.        Shoulder will be evaluated when patient has a room  ED COURSE   Procedures  ED ONGOING VITALS:  Vitals:    02/05/24 1832   BP: 135/66   Pulse: 75   Resp: 16   Temp: 98.9 °F (37.2 °C)   TempSrc: Oral   SpO2: 95%   Weight: 59 kg (129 lb 15.4 oz)         ABNORMAL LAB VALUES:  Labs Reviewed - No data to display      ALL LAB VALUES:        RADIOLOGY STUDIES:  Imaging Results              X-Ray Shoulder Trauma Left (Final result)  Result time 02/05/24 19:11:10      Final result by Getachew Brennan MD (02/05/24 19:11:10)                   Impression:      As above.      Electronically signed by: Getachew Brennan  Date:    02/05/2024  Time:    19:11               Narrative:    EXAMINATION:  XR SHOULDER TRAUMA 3 VIEW LEFT    CLINICAL HISTORY:  Pain in left shoulder    TECHNIQUE:  Three views of the left shoulder were performed.    COMPARISON  None    FINDINGS:  No fracture.  No traumatic malalignment.  No osseous destructive process.  Chronic left AC joint separation.                                              Patient eloped from waiting room while waiting for room.      MEDICAL DECISION MAKING                 CLINICAL IMPRESSION       ICD-10-CM ICD-9-CM   1. Shoulder pain, left  M25.512 719.41       Disposition:   Disposition: Eloped         Speedy Breaux NP  02/07/24 0140

## 2024-02-08 ENCOUNTER — HOSPITAL ENCOUNTER (EMERGENCY)
Facility: HOSPITAL | Age: 39
Discharge: HOME | End: 2024-02-08
Attending: EMERGENCY MEDICINE

## 2024-02-08 VITALS
SYSTOLIC BLOOD PRESSURE: 119 MMHG | BODY MASS INDEX: 21.7 KG/M2 | WEIGHT: 130.38 LBS | DIASTOLIC BLOOD PRESSURE: 70 MMHG | OXYGEN SATURATION: 100 % | TEMPERATURE: 99 F | RESPIRATION RATE: 18 BRPM | HEART RATE: 84 BPM

## 2024-02-08 DIAGNOSIS — M25.512 ACUTE PAIN OF LEFT SHOULDER: Primary | ICD-10-CM

## 2024-02-08 PROCEDURE — 99283 EMERGENCY DEPT VISIT LOW MDM: CPT | Mod: 25

## 2024-02-08 RX ORDER — HYDROCODONE BITARTRATE AND ACETAMINOPHEN 5; 325 MG/1; MG/1
1 TABLET ORAL EVERY 6 HOURS PRN
Qty: 12 TABLET | Refills: 0 | Status: SHIPPED | OUTPATIENT
Start: 2024-02-08

## 2024-02-08 NOTE — ED PROVIDER NOTES
Encounter Date: 2024       History     Chief Complaint   Patient presents with    Shoulder Pain     Left shoulder pain. Hx AC tear with ligament involvement.     The history is provided by the patient. No  was used.   Arm Injury   Illness onset: Acute on chronic left shoulder pain. There is an injury to the Left shoulder. Pertinent negatives include no chest pain, no altered mental status, no abdominal pain, no nausea, no vomiting, no weakness and no difficulty breathing.     Review of patient's allergies indicates:  No Known Allergies  Past Medical History:   Diagnosis Date    Anemia, unspecified      Past Surgical History:   Procedure Laterality Date     SECTION      x3    TONSILLECTOMY      TUBAL LIGATION Bilateral      No family history on file.  Social History     Tobacco Use    Smoking status: Never   Substance Use Topics    Alcohol use: Never     Review of Systems   Constitutional:  Negative for fever.   HENT:  Negative for sore throat.    Respiratory:  Negative for shortness of breath.    Cardiovascular:  Negative for chest pain.   Gastrointestinal:  Negative for abdominal pain, nausea and vomiting.   Genitourinary:  Negative for dysuria.   Musculoskeletal:  Positive for arthralgias. Negative for back pain.   Skin:  Negative for rash.   Neurological:  Negative for weakness.   Hematological:  Does not bruise/bleed easily.       Physical Exam     Initial Vitals [24 1424]   BP Pulse Resp Temp SpO2   119/70 84 18 99.1 °F (37.3 °C) 100 %      MAP       --         Physical Exam    Nursing note and vitals reviewed.  Constitutional: She appears well-developed and well-nourished. She is not diaphoretic. No distress.   HENT:   Head: Normocephalic and atraumatic.   Eyes: Right eye exhibits no discharge. Left eye exhibits no discharge.   Neck: Neck supple.   Normal range of motion.  Cardiovascular:  Normal rate.           Pulmonary/Chest: No respiratory distress.   Abdominal: She  exhibits no distension.   Musculoskeletal:      Cervical back: Normal range of motion and neck supple.      Comments: Left upper extremity Without any obvious deformity.  Distal pulses 2+.  Neurovascularly intact.     Neurological: She is alert and oriented to person, place, and time. She has normal strength.   Skin: Skin is warm and dry.   Psychiatric: She has a normal mood and affect. Her behavior is normal. Thought content normal.         ED Course   Procedures  Labs Reviewed   PREGNANCY TEST, URINE RAPID          Imaging Results              X-Ray Shoulder 2 or More Views Left (Final result)  Result time 02/08/24 15:13:29      Final result by Alexis MitchellLeiMD jamilah (02/08/24 15:13:29)                   Impression:      No definite acute bony abnormalities.      Electronically signed by: Alexis Mitchell MD  Date:    02/08/2024  Time:    15:13               Narrative:    EXAMINATION:  XR SHOULDER COMPLETE 2 OR MORE VIEWS LEFT    CLINICAL HISTORY:  left shoulder pain;    TECHNIQUE:  Standard radiography performed.  Two views.    COMPARISON:  Comparison 02/05/2024.    FINDINGS:  No definite fracture or dislocation.  Limited internal rotation.                                       Medications - No data to display  Medical Decision Making  Amount and/or Complexity of Data Reviewed  Radiology: ordered.    Risk  Prescription drug management.                                      Clinical Impression:  Final diagnoses:  [M25.512] Acute pain of left shoulder (Primary)          ED Disposition Condition    Discharge Stable          ED Prescriptions       Medication Sig Dispense Start Date End Date Auth. Provider    HYDROcodone-acetaminophen (NORCO) 5-325 mg per tablet Take 1 tablet by mouth every 6 (six) hours as needed for Pain. 12 tablet 2/8/2024 -- Wilfred Jones NP          Follow-up Information       Follow up With Specialties Details Why Contact Info    [facility] - Emergency Dept. Emergency Medicine  If  symptoms worsen [address]  [facility] [address] [phone]    Clinic, [facility]   As needed [address] [place] [address] [place] [id number]  [phone]               Wilfred Jones, NP  02/08/24 2523

## 2024-02-16 ENCOUNTER — HOSPITAL ENCOUNTER (EMERGENCY)
Facility: HOSPITAL | Age: 39
Discharge: HOME | End: 2024-02-16
Attending: EMERGENCY MEDICINE

## 2024-02-16 VITALS
BODY MASS INDEX: 36.22 KG/M2 | SYSTOLIC BLOOD PRESSURE: 131 MMHG | WEIGHT: 217.38 LBS | TEMPERATURE: 98 F | OXYGEN SATURATION: 99 % | DIASTOLIC BLOOD PRESSURE: 76 MMHG | HEIGHT: 65 IN | HEART RATE: 100 BPM | RESPIRATION RATE: 16 BRPM

## 2024-02-16 DIAGNOSIS — H72.91 PERFORATED EARDRUM, RIGHT: Primary | ICD-10-CM

## 2024-02-16 PROCEDURE — 25000003 PHARM REV CODE 250: Performed by: REGISTERED NURSE

## 2024-02-16 PROCEDURE — 99283 EMERGENCY DEPT VISIT LOW MDM: CPT

## 2024-02-16 RX ORDER — IBUPROFEN 800 MG/1
800 TABLET ORAL
Status: COMPLETED | OUTPATIENT
Start: 2024-02-16 | End: 2024-02-16

## 2024-02-16 RX ORDER — TRAMADOL HYDROCHLORIDE 50 MG/1
50 TABLET ORAL
Status: COMPLETED | OUTPATIENT
Start: 2024-02-16 | End: 2024-02-16

## 2024-02-16 RX ORDER — IBUPROFEN 800 MG/1
800 TABLET ORAL EVERY 6 HOURS PRN
Qty: 20 TABLET | Refills: 0 | Status: SHIPPED | OUTPATIENT
Start: 2024-02-16

## 2024-02-16 RX ORDER — TRAMADOL HYDROCHLORIDE 50 MG/1
50 TABLET ORAL EVERY 6 HOURS PRN
Qty: 12 TABLET | Refills: 0 | Status: SHIPPED | OUTPATIENT
Start: 2024-02-16

## 2024-02-16 RX ADMIN — IBUPROFEN 800 MG: 800 TABLET, FILM COATED ORAL at 11:02

## 2024-02-16 RX ADMIN — TRAMADOL HYDROCHLORIDE 50 MG: 50 TABLET, COATED ORAL at 11:02
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
## 2024-11-24 ENCOUNTER — HOSPITAL ENCOUNTER (EMERGENCY)
Facility: HOSPITAL | Age: 39
Discharge: HOME | End: 2024-11-24
Attending: EMERGENCY MEDICINE

## 2024-11-24 VITALS
HEART RATE: 73 BPM | DIASTOLIC BLOOD PRESSURE: 68 MMHG | OXYGEN SATURATION: 100 % | WEIGHT: 125.25 LBS | SYSTOLIC BLOOD PRESSURE: 109 MMHG | BODY MASS INDEX: 20.84 KG/M2 | TEMPERATURE: 98 F | RESPIRATION RATE: 17 BRPM

## 2024-11-24 DIAGNOSIS — R51.9 NONINTRACTABLE HEADACHE, UNSPECIFIED CHRONICITY PATTERN, UNSPECIFIED HEADACHE TYPE: ICD-10-CM

## 2024-11-24 DIAGNOSIS — S06.0X0A CONCUSSION WITHOUT LOSS OF CONSCIOUSNESS, INITIAL ENCOUNTER: Primary | ICD-10-CM

## 2024-11-24 PROCEDURE — 25000003 PHARM REV CODE 250: Performed by: NURSE PRACTITIONER

## 2024-11-24 PROCEDURE — 99284 EMERGENCY DEPT VISIT MOD MDM: CPT | Mod: 25

## 2024-11-24 RX ORDER — BUTALBITAL, ACETAMINOPHEN AND CAFFEINE 50; 325; 40 MG/1; MG/1; MG/1
1 TABLET ORAL
Status: COMPLETED | OUTPATIENT
Start: 2024-11-24 | End: 2024-11-24

## 2024-11-24 RX ORDER — BUTALBITAL, ACETAMINOPHEN AND CAFFEINE 50; 325; 40 MG/1; MG/1; MG/1
1 TABLET ORAL EVERY 6 HOURS PRN
Qty: 15 TABLET | Refills: 0 | Status: SHIPPED | OUTPATIENT
Start: 2024-11-24

## 2024-11-24 RX ADMIN — BUTALBITAL, ACETAMINOPHEN, AND CAFFEINE 1 TABLET: 325; 50; 40 TABLET ORAL at 09:11

## 2024-11-24 NOTE — ED PROVIDER NOTES
Encounter Date: 2024       History     Chief Complaint   Patient presents with    Head Injury     Reports L head pain after head injury against metal box yesterday. Reports nausea and light sensitivity      39-year-old female with complaint of headache and nausea with vomiting since this morning.  Patient reports that she was at work yesterday and she struck her head on a metal box when she turned around and did not realize the box was in front of her head.  Patient denies loss of consciousness.  Patient does report that she had a headache immediately after striking head.  Patient has no loss of memory.        Review of patient's allergies indicates:  No Known Allergies  Past Medical History:   Diagnosis Date    Anemia, unspecified      Past Surgical History:   Procedure Laterality Date     SECTION      x3    TONSILLECTOMY      TUBAL LIGATION Bilateral      No family history on file.  Social History     Tobacco Use    Smoking status: Never    Smokeless tobacco: Never   Substance Use Topics    Alcohol use: Never    Drug use: Not Currently     Review of Systems   Constitutional:  Negative for fever.   HENT:  Negative for sore throat.    Respiratory:  Negative for shortness of breath.    Cardiovascular:  Negative for chest pain.   Gastrointestinal:  Positive for nausea and vomiting.   Genitourinary:  Negative for dysuria.   Musculoskeletal:  Negative for back pain.   Skin:  Negative for rash.   Neurological:  Positive for headaches. Negative for weakness.   Hematological:  Does not bruise/bleed easily.       Physical Exam     Initial Vitals [24 0907]   BP Pulse Resp Temp SpO2   117/73 81 18 98 °F (36.7 °C) 100 %      MAP       --         Physical Exam    Nursing note and vitals reviewed.  Constitutional: She appears well-developed and well-nourished.   HENT:   Head: Normocephalic and atraumatic.   Eyes: Conjunctivae and EOM are normal. Pupils are equal, round, and reactive to light.   Neck: Neck  supple.   Normal range of motion.  Cardiovascular:  Normal rate, regular rhythm, normal heart sounds and intact distal pulses.           Pulmonary/Chest: Breath sounds normal.   Abdominal: Abdomen is soft. There is no abdominal tenderness. There is no rebound and no guarding.   Musculoskeletal:         General: Normal range of motion.      Cervical back: Normal range of motion and neck supple.     Neurological: She is alert and oriented to person, place, and time. She has normal strength and normal reflexes.   Skin: Skin is warm and dry.   Psychiatric: She has a normal mood and affect. Her behavior is normal. Thought content normal.         ED Course   Procedures  Labs Reviewed - No data to display       Imaging Results              CT Head Without Contrast (Final result)  Result time 11/24/24 09:47:12      Final result by Chandu Naranjo MD (11/24/24 09:47:12)                   Impression:      1.  Negative for acute intracranial process, considering the technical limitations described above.  Negative for hemorrhage, or skull fracture.    2.  Slightly accelerated cerebral atrophy.    3.  Nonemergent findings as described above.    All CT scans at this facility are performed  using dose modulation techniques as appropriate to performed exam including the following:  automated exposure control; adjustment of mA and/or kV according to the patients size (this includes techniques or standardized protocols for targeted exams where dose is matched to indication/reason for exam: i.e. extremities or head);  iterative reconstruction technique.      Electronically signed by: Chandu Naranjo MD  Date:    11/24/2024  Time:    09:47               Narrative:    EXAMINATION:  CT HEAD WITHOUT CONTRAST    CLINICAL HISTORY:  head trauma;    TECHNIQUE:  Axial images through the brain and posterior fossa were obtained without the use of IV contrast.  Sagittal and coronal reconstructions are provided for review.  Motion artifact is  present on a number of images.  There is also streak artifact from a right-sided earring. Subtle abnormalities could be overlooked.    COMPARISON:  No comparison studies are available.    FINDINGS:  The ventricles are midline and the CSF spaces are prominent for age.  The gray-white matter junction is well preserved. Negative for intracranial vascular abnormalities. Negative for mass, mass effect, cerebral edema, hemorrhage or abnormal fluid collections.  Basal ganglia calcifications noted.    The skull and scalp are  intact.  Hypoplastic frontal sinuses.  Leftward nasal septal deviation.  The rest of the paranasal sinuses, mastoid air cells, middle ears and ear canals are clear. The globes are intact.                                       Imaging Results              CT Head Without Contrast (Final result)  Result time 11/24/24 09:47:12      Final result by Chandu Naranjo MD (11/24/24 09:47:12)                   Impression:      1.  Negative for acute intracranial process, considering the technical limitations described above.  Negative for hemorrhage, or skull fracture.    2.  Slightly accelerated cerebral atrophy.    3.  Nonemergent findings as described above.    All CT scans at this facility are performed  using dose modulation techniques as appropriate to performed exam including the following:  automated exposure control; adjustment of mA and/or kV according to the patients size (this includes techniques or standardized protocols for targeted exams where dose is matched to indication/reason for exam: i.e. extremities or head);  iterative reconstruction technique.      Electronically signed by: Chandu Naranjo MD  Date:    11/24/2024  Time:    09:47               Narrative:    EXAMINATION:  CT HEAD WITHOUT CONTRAST    CLINICAL HISTORY:  head trauma;    TECHNIQUE:  Axial images through the brain and posterior fossa were obtained without the use of IV contrast.  Sagittal and coronal reconstructions are provided  for review.  Motion artifact is present on a number of images.  There is also streak artifact from a right-sided earring. Subtle abnormalities could be overlooked.    COMPARISON:  No comparison studies are available.    FINDINGS:  The ventricles are midline and the CSF spaces are prominent for age.  The gray-white matter junction is well preserved. Negative for intracranial vascular abnormalities. Negative for mass, mass effect, cerebral edema, hemorrhage or abnormal fluid collections.  Basal ganglia calcifications noted.    The skull and scalp are  intact.  Hypoplastic frontal sinuses.  Leftward nasal septal deviation.  The rest of the paranasal sinuses, mastoid air cells, middle ears and ear canals are clear. The globes are intact.                                      Medications   butalbital-acetaminophen-caffeine -40 mg per tablet 1 tablet (1 tablet Oral Given 11/24/24 0930)     Medical Decision Making                                    Clinical Impression:  Final diagnoses:  [S06.0X0A] Concussion without loss of consciousness, initial encounter (Primary)  [R51.9] Nonintractable headache, unspecified chronicity pattern, unspecified headache type          ED Disposition Condition    Discharge Stable          ED Prescriptions       Medication Sig Dispense Start Date End Date Auth. Provider    butalbital-acetaminophen-caffeine -40 mg (FIORICET, ESGIC) -40 mg per tablet Take 1 tablet by mouth every 6 (six) hours as needed for Headaches. 15 tablet 11/24/2024 -- Alphonso Grider NP          Follow-up Information       Follow up With Specialties Details Why Contact Info    [facility]  Schedule an appointment as soon as possible for a visit in 2 days  [phone]             Alphonso Grider NP  11/24/24 1397

## 2024-11-24 NOTE — Clinical Note
"Benny"Dejan Gustafson was seen and treated in our emergency department on 11/24/2024.  She may return to work on 11/27/2024.       If you have any questions or concerns, please don't hesitate to call.      Alphonso Grider NP"